# Patient Record
Sex: MALE | Race: WHITE | NOT HISPANIC OR LATINO | Employment: FULL TIME | URBAN - METROPOLITAN AREA
[De-identification: names, ages, dates, MRNs, and addresses within clinical notes are randomized per-mention and may not be internally consistent; named-entity substitution may affect disease eponyms.]

---

## 2018-11-26 ENCOUNTER — OFFICE VISIT (OUTPATIENT)
Dept: FAMILY MEDICINE CLINIC | Facility: CLINIC | Age: 28
End: 2018-11-26

## 2018-11-26 VITALS
BODY MASS INDEX: 26.21 KG/M2 | WEIGHT: 204.2 LBS | TEMPERATURE: 98.4 F | DIASTOLIC BLOOD PRESSURE: 70 MMHG | SYSTOLIC BLOOD PRESSURE: 110 MMHG | OXYGEN SATURATION: 98 % | HEIGHT: 74 IN | HEART RATE: 56 BPM

## 2018-11-26 DIAGNOSIS — J35.8 TONSIL STONE: ICD-10-CM

## 2018-11-26 DIAGNOSIS — I34.1 MVP (MITRAL VALVE PROLAPSE): Primary | ICD-10-CM

## 2018-11-26 DIAGNOSIS — R68.82 LIBIDO, DECREASED: ICD-10-CM

## 2018-11-26 DIAGNOSIS — J34.89 SINUS DRAINAGE: ICD-10-CM

## 2018-11-26 DIAGNOSIS — N52.8 OTHER MALE ERECTILE DYSFUNCTION: ICD-10-CM

## 2018-11-26 LAB
ALBUMIN SERPL-MCNC: 4.3 G/DL (ref 3.5–5.2)
ALBUMIN/GLOB SERPL: 1.5 G/DL
ALP SERPL-CCNC: 70 U/L (ref 39–117)
ALT SERPL W P-5'-P-CCNC: 58 U/L (ref 1–41)
ANION GAP SERPL CALCULATED.3IONS-SCNC: 7.9 MMOL/L
AST SERPL-CCNC: 37 U/L (ref 1–40)
BILIRUB SERPL-MCNC: 1 MG/DL (ref 0.1–1.2)
BUN BLD-MCNC: 11 MG/DL (ref 6–20)
BUN/CREAT SERPL: 11 (ref 7–25)
CALCIUM SPEC-SCNC: 9.6 MG/DL (ref 8.6–10.5)
CHLORIDE SERPL-SCNC: 103 MMOL/L (ref 98–107)
CO2 SERPL-SCNC: 28.1 MMOL/L (ref 22–29)
CREAT BLD-MCNC: 1 MG/DL (ref 0.76–1.27)
GFR SERPL CREATININE-BSD FRML MDRD: 109 ML/MIN/1.73
GFR SERPL CREATININE-BSD FRML MDRD: 90 ML/MIN/1.73
GLOBULIN UR ELPH-MCNC: 2.8 GM/DL
GLUCOSE BLD-MCNC: 94 MG/DL (ref 65–99)
POTASSIUM BLD-SCNC: 4.3 MMOL/L (ref 3.5–5.2)
PROT SERPL-MCNC: 7.1 G/DL (ref 6–8.5)
SODIUM BLD-SCNC: 139 MMOL/L (ref 136–145)
TSH SERPL DL<=0.05 MIU/L-ACNC: 1.18 MIU/ML (ref 0.27–4.2)

## 2018-11-26 PROCEDURE — 84443 ASSAY THYROID STIM HORMONE: CPT | Performed by: INTERNAL MEDICINE

## 2018-11-26 PROCEDURE — 99214 OFFICE O/P EST MOD 30 MIN: CPT | Performed by: INTERNAL MEDICINE

## 2018-11-26 PROCEDURE — 36415 COLL VENOUS BLD VENIPUNCTURE: CPT | Performed by: INTERNAL MEDICINE

## 2018-11-26 PROCEDURE — 80053 COMPREHEN METABOLIC PANEL: CPT | Performed by: INTERNAL MEDICINE

## 2018-11-26 RX ORDER — MONTELUKAST SODIUM 10 MG/1
10 TABLET ORAL NIGHTLY
Qty: 30 TABLET | Refills: 0 | Status: SHIPPED | OUTPATIENT
Start: 2018-11-26 | End: 2020-09-23

## 2018-11-27 DIAGNOSIS — I34.1 MVP (MITRAL VALVE PROLAPSE): Primary | ICD-10-CM

## 2018-11-27 LAB
TESTOST FREE SERPL-MCNC: 13.6 PG/ML (ref 9.3–26.5)
TESTOST SERPL-MCNC: 580 NG/DL (ref 264–916)

## 2018-12-13 ENCOUNTER — HOSPITAL ENCOUNTER (EMERGENCY)
Facility: HOSPITAL | Age: 28
Discharge: HOME | End: 2018-12-13
Attending: EMERGENCY MEDICINE | Admitting: EMERGENCY MEDICINE

## 2018-12-13 ENCOUNTER — APPOINTMENT (OUTPATIENT)
Dept: GENERAL RADIOLOGY | Facility: HOSPITAL | Age: 28
End: 2018-12-13

## 2018-12-13 VITALS
DIASTOLIC BLOOD PRESSURE: 82 MMHG | TEMPERATURE: 97.8 F | BODY MASS INDEX: 25.67 KG/M2 | RESPIRATION RATE: 16 BRPM | HEIGHT: 74 IN | OXYGEN SATURATION: 97 % | WEIGHT: 200 LBS | SYSTOLIC BLOOD PRESSURE: 127 MMHG | HEART RATE: 86 BPM

## 2018-12-13 DIAGNOSIS — S46.911A STRAIN OF RIGHT SHOULDER, INITIAL ENCOUNTER: Primary | ICD-10-CM
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
## 2018-12-13 PROCEDURE — 73030 X-RAY EXAM OF SHOULDER: CPT

## 2018-12-13 PROCEDURE — 99283 EMERGENCY DEPT VISIT LOW MDM: CPT

## 2018-12-13 RX ORDER — NAPROXEN 500 MG/1
500 TABLET ORAL 2 TIMES DAILY WITH MEALS
Qty: 20 TABLET | Refills: 0 | Status: SHIPPED | OUTPATIENT
Start: 2018-12-13 | End: 2018-12-24

## 2018-12-13 RX ORDER — MONTELUKAST SODIUM 10 MG/1
10 TABLET ORAL NIGHTLY
COMMUNITY
End: 2018-12-24
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
## 2018-12-17 ENCOUNTER — APPOINTMENT (OUTPATIENT)
Dept: CARDIOLOGY | Facility: HOSPITAL | Age: 28
End: 2018-12-17
Attending: INTERNAL MEDICINE

## 2018-12-19 ENCOUNTER — TELEPHONE (OUTPATIENT)
Dept: FAMILY MEDICINE CLINIC | Facility: CLINIC | Age: 28
End: 2018-12-19

## 2018-12-19 DIAGNOSIS — N52.9 ERECTILE DYSFUNCTION, UNSPECIFIED ERECTILE DYSFUNCTION TYPE: Primary | ICD-10-CM

## 2018-12-24 ENCOUNTER — OFFICE VISIT (OUTPATIENT)
Dept: CARDIOLOGY | Facility: CLINIC | Age: 28
End: 2018-12-24

## 2018-12-24 ENCOUNTER — HOSPITAL ENCOUNTER (OUTPATIENT)
Dept: CARDIOLOGY | Facility: HOSPITAL | Age: 28
Discharge: HOME | End: 2018-12-24
Attending: INTERNAL MEDICINE | Admitting: INTERNAL MEDICINE

## 2018-12-24 VITALS
WEIGHT: 205 LBS | HEART RATE: 66 BPM | SYSTOLIC BLOOD PRESSURE: 116 MMHG | DIASTOLIC BLOOD PRESSURE: 76 MMHG | BODY MASS INDEX: 26.31 KG/M2 | HEIGHT: 74 IN

## 2018-12-24 DIAGNOSIS — I34.1 MVP (MITRAL VALVE PROLAPSE): ICD-10-CM

## 2018-12-24 DIAGNOSIS — R00.2 PALPITATIONS: Primary | ICD-10-CM

## 2018-12-24 LAB
AORTIC ARCH: 2.38 CM
AORTIC DIMENSIONLESS INDEX: 0.7 (DI)
ASCENDING AORTA: 2.8 CM
BH CV ECHO MEAS - ACS: 2.3 CM
BH CV ECHO MEAS - AO MAX PG (FULL): 1.8 MMHG
BH CV ECHO MEAS - AO MAX PG: 4.6 MMHG
BH CV ECHO MEAS - AO MEAN PG (FULL): 0.8 MMHG
BH CV ECHO MEAS - AO MEAN PG: 2.4 MMHG
BH CV ECHO MEAS - AO ROOT AREA (BSA CORRECTED): 1.4
BH CV ECHO MEAS - AO ROOT AREA: 7.4 CM^2
BH CV ECHO MEAS - AO ROOT DIAM: 3.1 CM
BH CV ECHO MEAS - AO V2 MAX: 107.5 CM/SEC
BH CV ECHO MEAS - AO V2 MEAN: 71.4 CM/SEC
BH CV ECHO MEAS - AO V2 VTI: 21.9 CM
BH CV ECHO MEAS - ASC AORTA: 2.8 CM
BH CV ECHO MEAS - AVA(I,A): 1.9 CM^2
BH CV ECHO MEAS - AVA(I,D): 1.9 CM^2
BH CV ECHO MEAS - AVA(V,A): 2 CM^2
BH CV ECHO MEAS - AVA(V,D): 2 CM^2
BH CV ECHO MEAS - BSA(HAYCOCK): 2.2 M^2
BH CV ECHO MEAS - BSA: 2.1 M^2
BH CV ECHO MEAS - BZI_BMI: 25 KILOGRAMS/M^2
BH CV ECHO MEAS - BZI_METRIC_HEIGHT: 188 CM
BH CV ECHO MEAS - BZI_METRIC_WEIGHT: 88.5 KG
BH CV ECHO MEAS - EDV(CUBED): 131.3 ML
BH CV ECHO MEAS - EDV(MOD-SP2): 125 ML
BH CV ECHO MEAS - EDV(MOD-SP4): 102 ML
BH CV ECHO MEAS - EDV(TEICH): 122.8 ML
BH CV ECHO MEAS - EF(CUBED): 77 %
BH CV ECHO MEAS - EF(MOD-BP): 62 %
BH CV ECHO MEAS - EF(MOD-SP2): 56.8 %
BH CV ECHO MEAS - EF(MOD-SP4): 63.7 %
BH CV ECHO MEAS - EF(TEICH): 68.8 %
BH CV ECHO MEAS - ESV(CUBED): 30.2 ML
BH CV ECHO MEAS - ESV(MOD-SP2): 54 ML
BH CV ECHO MEAS - ESV(MOD-SP4): 37 ML
BH CV ECHO MEAS - ESV(TEICH): 38.3 ML
BH CV ECHO MEAS - FS: 38.7 %
BH CV ECHO MEAS - IVS/LVPW: 1
BH CV ECHO MEAS - IVSD: 0.86 CM
BH CV ECHO MEAS - LAT PEAK E' VEL: 11 CM/SEC
BH CV ECHO MEAS - LV DIASTOLIC VOL/BSA (35-75): 47.4 ML/M^2
BH CV ECHO MEAS - LV MASS(C)D: 148.9 GRAMS
BH CV ECHO MEAS - LV MASS(C)DI: 69.3 GRAMS/M^2
BH CV ECHO MEAS - LV MAX PG: 2.8 MMHG
BH CV ECHO MEAS - LV MEAN PG: 1.6 MMHG
BH CV ECHO MEAS - LV SYSTOLIC VOL/BSA (12-30): 17.2 ML/M^2
BH CV ECHO MEAS - LV V1 MAX: 83.9 CM/SEC
BH CV ECHO MEAS - LV V1 MEAN: 59.9 CM/SEC
BH CV ECHO MEAS - LV V1 VTI: 16 CM
BH CV ECHO MEAS - LVIDD: 5.1 CM
BH CV ECHO MEAS - LVIDS: 3.1 CM
BH CV ECHO MEAS - LVLD AP2: 8.7 CM
BH CV ECHO MEAS - LVLD AP4: 7.9 CM
BH CV ECHO MEAS - LVLS AP2: 7.1 CM
BH CV ECHO MEAS - LVLS AP4: 6.8 CM
BH CV ECHO MEAS - LVOT AREA (M): 2.5 CM^2
BH CV ECHO MEAS - LVOT AREA: 2.6 CM^2
BH CV ECHO MEAS - LVOT DIAM: 1.8 CM
BH CV ECHO MEAS - LVPWD: 0.82 CM
BH CV ECHO MEAS - MED PEAK E' VEL: 10 CM/SEC
BH CV ECHO MEAS - MV A DUR: 0.1 SEC
BH CV ECHO MEAS - MV A MAX VEL: 48.5 CM/SEC
BH CV ECHO MEAS - MV DEC SLOPE: 493.4 CM/SEC^2
BH CV ECHO MEAS - MV DEC TIME: 0.11 SEC
BH CV ECHO MEAS - MV E MAX VEL: 71.8 CM/SEC
BH CV ECHO MEAS - MV E/A: 1.5
BH CV ECHO MEAS - MV MAX PG: 3 MMHG
BH CV ECHO MEAS - MV MEAN PG: 1.2 MMHG
BH CV ECHO MEAS - MV P1/2T MAX VEL: 84.3 CM/SEC
BH CV ECHO MEAS - MV P1/2T: 50 MSEC
BH CV ECHO MEAS - MV V2 MAX: 86.9 CM/SEC
BH CV ECHO MEAS - MV V2 MEAN: 51.3 CM/SEC
BH CV ECHO MEAS - MV V2 VTI: 17.7 CM
BH CV ECHO MEAS - MVA P1/2T LCG: 2.6 CM^2
BH CV ECHO MEAS - MVA(P1/2T): 4.4 CM^2
BH CV ECHO MEAS - MVA(VTI): 2.3 CM^2
BH CV ECHO MEAS - PA ACC TIME: 0.1 SEC
BH CV ECHO MEAS - PA MAX PG (FULL): 2.7 MMHG
BH CV ECHO MEAS - PA MAX PG: 4.8 MMHG
BH CV ECHO MEAS - PA PR(ACCEL): 34.6 MMHG
BH CV ECHO MEAS - PA V2 MAX: 109.9 CM/SEC
BH CV ECHO MEAS - PI END-D VEL: 105.9 CM/SEC
BH CV ECHO MEAS - PULM A REVS DUR: 0.11 SEC
BH CV ECHO MEAS - PULM A REVS VEL: 23.3 CM/SEC
BH CV ECHO MEAS - PULM DIAS VEL: 43.7 CM/SEC
BH CV ECHO MEAS - PULM S/D: 0.84
BH CV ECHO MEAS - PULM SYS VEL: 36.9 CM/SEC
BH CV ECHO MEAS - PVA(V,A): 1.5 CM^2
BH CV ECHO MEAS - PVA(V,D): 1.5 CM^2
BH CV ECHO MEAS - QP/QS: 0.91
BH CV ECHO MEAS - RAP SYSTOLE: 3 MMHG
BH CV ECHO MEAS - RV MAX PG: 2.1 MMHG
BH CV ECHO MEAS - RV MEAN PG: 1.3 MMHG
BH CV ECHO MEAS - RV V1 MAX: 72.8 CM/SEC
BH CV ECHO MEAS - RV V1 MEAN: 54.2 CM/SEC
BH CV ECHO MEAS - RV V1 VTI: 16.2 CM
BH CV ECHO MEAS - RVOT AREA: 2.3 CM^2
BH CV ECHO MEAS - RVOT DIAM: 1.7 CM
BH CV ECHO MEAS - RVSP: 16 MMHG
BH CV ECHO MEAS - SI(AO): 75.7 ML/M^2
BH CV ECHO MEAS - SI(CUBED): 47 ML/M^2
BH CV ECHO MEAS - SI(LVOT): 19.2 ML/M^2
BH CV ECHO MEAS - SI(MOD-SP2): 33 ML/M^2
BH CV ECHO MEAS - SI(MOD-SP4): 30.2 ML/M^2
BH CV ECHO MEAS - SI(TEICH): 39.3 ML/M^2
BH CV ECHO MEAS - SUP REN AO DIAM: 1.57 CM
BH CV ECHO MEAS - SV(AO): 162.8 ML
BH CV ECHO MEAS - SV(CUBED): 101.1 ML
BH CV ECHO MEAS - SV(LVOT): 41.3 ML
BH CV ECHO MEAS - SV(MOD-SP2): 71 ML
BH CV ECHO MEAS - SV(MOD-SP4): 65 ML
BH CV ECHO MEAS - SV(RVOT): 37.5 ML
BH CV ECHO MEAS - SV(TEICH): 84.5 ML
BH CV ECHO MEAS - TAPSE (>1.6): 2.3 CM2
BH CV ECHO MEAS - TR MAX VEL: 178.1 CM/SEC
BH CV ECHO MEASUREMENTS AVERAGE E/E' RATIO: 6.84
BH CV VAS BP RIGHT ARM: NORMAL MMHG
BH CV XLRA - RV BASE: 2.68 CM
BH CV XLRA - TDI S': 10.1 CM/SEC
LEFT ATRIUM VOLUME INDEX: 21 ML/M2
LV EF 2D ECHO EST: 62 %
MAXIMAL PREDICTED HEART RATE: 194 BPM
SINUS: 2.9 CM
STJ: 2.86 CM
STRESS TARGET HR: 165 BPM

## 2018-12-24 PROCEDURE — 93000 ELECTROCARDIOGRAM COMPLETE: CPT | Performed by: INTERNAL MEDICINE

## 2018-12-24 PROCEDURE — 93306 TTE W/DOPPLER COMPLETE: CPT

## 2018-12-24 PROCEDURE — 99203 OFFICE O/P NEW LOW 30 MIN: CPT | Performed by: INTERNAL MEDICINE

## 2018-12-24 PROCEDURE — 93306 TTE W/DOPPLER COMPLETE: CPT | Performed by: INTERNAL MEDICINE

## 2018-12-26 NOTE — PROGRESS NOTES
Subjective:     Encounter Date:2018      Patient ID: Trav Steward III is a 28 y.o. male.    Chief Complaint:  28 year old man with remote history of Still's disease about 5 years ago He was hospitalized at [facility] at that time with joint pains in multiple regions. Echo at that time showed MVP with mild MR according to the patient. He was asked to repeat his echo in 2-3 years. Since then he has done well. Is very active as a . Occasionally gets palpitations, mostly at night during times of stress. They can last for minutes to half an hour but have no associated symptoms and are not bothersome during the day.         The following portions of the patient's history were reviewed and updated as appropriate: allergies, current medications, past family history, past medical history, past social history, past surgical history and problem list.    Past Medical History:   Diagnosis Date   • Mitral valve prolapse    • Reduced libido    • Right shoulder pain    • Tonsil stone        Family History   Problem Relation Age of Onset   • Hypertension Mother    • Diabetes Maternal Grandfather        Social History     Socioeconomic History   • Marital status: Single     Spouse name: Not on file   • Number of children: Not on file   • Years of education: Not on file   • Highest education level: Not on file   Tobacco Use   • Smoking status: Former Smoker     Last attempt to quit: 2013     Years since quittin.0   • Smokeless tobacco: Never Used   • Tobacco comment: caffeine use   Substance and Sexual Activity   • Alcohol use: Yes     Frequency: Never     Comment: Socially   • Drug use: Defer   • Sexual activity: Defer   Social History Narrative    ** Merged History Encounter **            Allergies   Allergen Reactions   • Penicillins    • Penicillins Unknown (See Comments)     Childhood allergy        No past surgical history on file.    Review of Systems   Constitution: Negative.    Cardiovascular: Negative.    Respiratory: Negative.          ECG 12 Lead  Date/Time: 12/26/2018 6:07 PM  Performed by: Teressa Olivera MD  Authorized by: Teressa Olivera MD   Previous ECG: no previous ECG available  Rhythm: sinus rhythm  Rate: normal  QRS axis: normal  Clinical impression: normal ECG               Objective:     Physical Exam  GEN: no distress, alert and oriented  Lungs CTAB, no rales or wheezes  Chest: no abnormalities  Heart: RRR, no murmurs  Abdo: soft,  Nontender, nondistended  Extr: no edema, +2 DP and 2+ carotid pulses b/l  Skin: no rash or bruising  Psych: organized thought, normal behavior and affect    Lab Review:         Assessment:         No diagnosis found.       Plan:       28 year old man with PMH of Still's disease, and MVP    1. Echo today shows normal Mitral valve without regurgitation or significant prolapse. I discussed with the patient that this is reassuring and that at the most he could repeat the echo in 5 years to verify but I don't feel strongly about it. He does not require any dental prophylaxis or restriction in activities.   2. Palpitations- he did have frequent palpitations a few months ago but states he has barely had them once a month recently. I instructed him to call me if the palpitations recur more frequently and we can do a holter at that time. I don't think we'd be able to catch them at this point.     Thank you for including me in the care of this patient. Will continue to follow the patient as needed. Please call with any further questions or concerns.          Teressa Olivera MD  12/26/18

## 2019-03-22 ENCOUNTER — TELEPHONE (OUTPATIENT)
Dept: CARDIOLOGY | Facility: CLINIC | Age: 29
End: 2019-03-22

## 2019-03-22 DIAGNOSIS — Q21.12 PFO (PATENT FORAMEN OVALE): Primary | ICD-10-CM
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
## 2019-05-09 ENCOUNTER — HOSPITAL ENCOUNTER (OUTPATIENT)
Dept: CARDIOLOGY | Facility: HOSPITAL | Age: 29
Discharge: HOME | End: 2019-05-09
Admitting: INTERNAL MEDICINE

## 2019-05-09 VITALS
HEART RATE: 70 BPM | HEIGHT: 74 IN | OXYGEN SATURATION: 97 % | SYSTOLIC BLOOD PRESSURE: 122 MMHG | DIASTOLIC BLOOD PRESSURE: 58 MMHG | WEIGHT: 190 LBS | BODY MASS INDEX: 24.38 KG/M2

## 2019-05-09 DIAGNOSIS — Q21.12 PFO (PATENT FORAMEN OVALE): ICD-10-CM

## 2019-05-09 LAB
BH CV ECHO MEAS - ACS: 2.3 CM
BH CV ECHO MEAS - AO ROOT AREA (BSA CORRECTED): 1.5
BH CV ECHO MEAS - AO ROOT AREA: 7.7 CM^2
BH CV ECHO MEAS - AO ROOT DIAM: 3.1 CM
BH CV ECHO MEAS - BSA(HAYCOCK): 2.1 M^2
BH CV ECHO MEAS - BSA: 2.1 M^2
BH CV ECHO MEAS - BZI_BMI: 24.4 KILOGRAMS/M^2
BH CV ECHO MEAS - BZI_METRIC_HEIGHT: 188 CM
BH CV ECHO MEAS - BZI_METRIC_WEIGHT: 86.2 KG
BH CV ECHO MEAS - EDV(CUBED): 145.2 ML
BH CV ECHO MEAS - EDV(MOD-SP2): 101 ML
BH CV ECHO MEAS - EDV(MOD-SP4): 121 ML
BH CV ECHO MEAS - EDV(TEICH): 132.7 ML
BH CV ECHO MEAS - EF(CUBED): 87.2 %
BH CV ECHO MEAS - EF(MOD-BP): 57 %
BH CV ECHO MEAS - EF(MOD-SP2): 57.4 %
BH CV ECHO MEAS - EF(MOD-SP4): 57 %
BH CV ECHO MEAS - EF(TEICH): 80.6 %
BH CV ECHO MEAS - ESV(CUBED): 18.6 ML
BH CV ECHO MEAS - ESV(MOD-SP2): 43 ML
BH CV ECHO MEAS - ESV(MOD-SP4): 52 ML
BH CV ECHO MEAS - ESV(TEICH): 25.8 ML
BH CV ECHO MEAS - FS: 49.6 %
BH CV ECHO MEAS - IVS/LVPW: 1.1
BH CV ECHO MEAS - IVSD: 1.1 CM
BH CV ECHO MEAS - LV DIASTOLIC VOL/BSA (35-75): 56.9 ML/M^2
BH CV ECHO MEAS - LV MASS(C)D: 208.9 GRAMS
BH CV ECHO MEAS - LV MASS(C)DI: 98.2 GRAMS/M^2
BH CV ECHO MEAS - LV SYSTOLIC VOL/BSA (12-30): 24.5 ML/M^2
BH CV ECHO MEAS - LVIDD: 5.3 CM
BH CV ECHO MEAS - LVIDS: 2.6 CM
BH CV ECHO MEAS - LVLD AP2: 8.6 CM
BH CV ECHO MEAS - LVLD AP4: 8.6 CM
BH CV ECHO MEAS - LVLS AP2: 7.4 CM
BH CV ECHO MEAS - LVLS AP4: 7.5 CM
BH CV ECHO MEAS - LVPWD: 1 CM
BH CV ECHO MEAS - SI(CUBED): 59.5 ML/M^2
BH CV ECHO MEAS - SI(MOD-SP2): 27.3 ML/M^2
BH CV ECHO MEAS - SI(MOD-SP4): 32.5 ML/M^2
BH CV ECHO MEAS - SI(TEICH): 50.3 ML/M^2
BH CV ECHO MEAS - SV(CUBED): 126.6 ML
BH CV ECHO MEAS - SV(MOD-SP2): 58 ML
BH CV ECHO MEAS - SV(MOD-SP4): 69 ML
BH CV ECHO MEAS - SV(TEICH): 106.9 ML
BH CV ECHO MEAS - TR MAX VEL: 142.5 CM/SEC
LEFT ATRIUM VOLUME INDEX: 25 ML/M2

## 2019-05-09 PROCEDURE — 93325 DOPPLER ECHO COLOR FLOW MAPG: CPT | Performed by: INTERNAL MEDICINE

## 2019-05-09 PROCEDURE — 93321 DOPPLER ECHO F-UP/LMTD STD: CPT

## 2019-05-09 PROCEDURE — 93308 TTE F-UP OR LMTD: CPT

## 2019-05-09 PROCEDURE — 93308 TTE F-UP OR LMTD: CPT | Performed by: INTERNAL MEDICINE

## 2019-05-09 PROCEDURE — 93325 DOPPLER ECHO COLOR FLOW MAPG: CPT

## 2019-05-09 PROCEDURE — 93321 DOPPLER ECHO F-UP/LMTD STD: CPT | Performed by: INTERNAL MEDICINE

## 2019-05-14 ENCOUNTER — TELEPHONE (OUTPATIENT)
Dept: CARDIOLOGY | Facility: CLINIC | Age: 29
End: 2019-05-14
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
## 2019-08-12 ENCOUNTER — TELEPHONE (OUTPATIENT)
Dept: FAMILY MEDICINE CLINIC | Facility: CLINIC | Age: 29
End: 2019-08-12

## 2019-08-12 NOTE — TELEPHONE ENCOUNTER
Patient informed we can do appt with xray of chest if he is concerned.  Right now he has no symptoms and just wanted to report it.   He will call if symptoms of wheezing, sob occur.alexandra

## 2020-09-22 ENCOUNTER — OFFICE VISIT (OUTPATIENT)
Dept: FAMILY MEDICINE CLINIC | Facility: CLINIC | Age: 30
End: 2020-09-22

## 2020-09-22 VITALS
WEIGHT: 199 LBS | OXYGEN SATURATION: 100 % | DIASTOLIC BLOOD PRESSURE: 76 MMHG | TEMPERATURE: 98.9 F | HEART RATE: 101 BPM | BODY MASS INDEX: 25.54 KG/M2 | SYSTOLIC BLOOD PRESSURE: 110 MMHG | HEIGHT: 74 IN

## 2020-09-22 DIAGNOSIS — R10.9 ABDOMINAL CRAMPING: Primary | ICD-10-CM

## 2020-09-22 DIAGNOSIS — K40.90 HERNIA, INGUINAL, RIGHT: ICD-10-CM

## 2020-09-22 DIAGNOSIS — Z83.79 FH: CROHN'S DISEASE: ICD-10-CM

## 2020-09-22 LAB
ALBUMIN SERPL-MCNC: 4.3 G/DL (ref 3.5–5.2)
ALBUMIN/GLOB SERPL: 1.7 G/DL
ALP SERPL-CCNC: 84 U/L (ref 39–117)
ALT SERPL W P-5'-P-CCNC: 23 U/L (ref 1–41)
ANION GAP SERPL CALCULATED.3IONS-SCNC: 10.6 MMOL/L (ref 5–15)
AST SERPL-CCNC: 21 U/L (ref 1–40)
BILIRUB SERPL-MCNC: 0.4 MG/DL (ref 0–1.2)
BUN SERPL-MCNC: 12 MG/DL (ref 6–20)
BUN/CREAT SERPL: 12 (ref 7–25)
CALCIUM SPEC-SCNC: 9.7 MG/DL (ref 8.6–10.5)
CHLORIDE SERPL-SCNC: 104 MMOL/L (ref 98–107)
CO2 SERPL-SCNC: 26.4 MMOL/L (ref 22–29)
CREAT SERPL-MCNC: 1 MG/DL (ref 0.76–1.27)
ERYTHROCYTE [DISTWIDTH] IN BLOOD BY AUTOMATED COUNT: 12 % (ref 12.3–15.4)
ERYTHROCYTE [SEDIMENTATION RATE] IN BLOOD: 1 MM/HR (ref 0–15)
GFR SERPL CREATININE-BSD FRML MDRD: 108 ML/MIN/1.73
GFR SERPL CREATININE-BSD FRML MDRD: 89 ML/MIN/1.73
GLOBULIN UR ELPH-MCNC: 2.5 GM/DL
GLUCOSE SERPL-MCNC: 80 MG/DL (ref 65–99)
HCT VFR BLD AUTO: 45.6 % (ref 37.5–51)
HGB BLD-MCNC: 15.5 G/DL (ref 13–17.7)
LYMPHOCYTES # BLD AUTO: 2.4 10*3/MM3 (ref 0.7–3.1)
LYMPHOCYTES NFR BLD AUTO: 37.5 % (ref 19.6–45.3)
MCH RBC QN AUTO: 29.5 PG (ref 26.6–33)
MCHC RBC AUTO-ENTMCNC: 33.9 G/DL (ref 31.5–35.7)
MCV RBC AUTO: 87.1 FL (ref 79–97)
MONOCYTES # BLD AUTO: 0.4 10*3/MM3 (ref 0.1–0.9)
MONOCYTES NFR BLD AUTO: 6.8 % (ref 5–12)
NEUTROPHILS NFR BLD AUTO: 3.5 10*3/MM3 (ref 1.7–7)
NEUTROPHILS NFR BLD AUTO: 55.7 % (ref 42.7–76)
PLATELET # BLD AUTO: 218 10*3/MM3 (ref 140–450)
PMV BLD AUTO: 7.7 FL (ref 6–12)
POTASSIUM SERPL-SCNC: 4 MMOL/L (ref 3.5–5.2)
PROT SERPL-MCNC: 6.8 G/DL (ref 6–8.5)
RBC # BLD AUTO: 5.24 10*6/MM3 (ref 4.14–5.8)
SODIUM SERPL-SCNC: 141 MMOL/L (ref 136–145)
WBC # BLD AUTO: 6.3 10*3/MM3 (ref 3.4–10.8)

## 2020-09-22 PROCEDURE — 85025 COMPLETE CBC W/AUTO DIFF WBC: CPT | Performed by: INTERNAL MEDICINE

## 2020-09-22 PROCEDURE — 80053 COMPREHEN METABOLIC PANEL: CPT | Performed by: INTERNAL MEDICINE

## 2020-09-22 PROCEDURE — 36415 COLL VENOUS BLD VENIPUNCTURE: CPT | Performed by: INTERNAL MEDICINE

## 2020-09-22 PROCEDURE — 85652 RBC SED RATE AUTOMATED: CPT | Performed by: INTERNAL MEDICINE

## 2020-09-22 PROCEDURE — 99214 OFFICE O/P EST MOD 30 MIN: CPT | Performed by: INTERNAL MEDICINE

## 2020-09-22 NOTE — PROGRESS NOTES
Subjective   Trav Steward III is a 30 y.o. male.  Patient here for 2 reasons 1 he thinks he is got a bulge or possible hernia in the right groin area main reason is can be for his recurrent cramping loose stools.  Was supposed to be seen a GI many several years ago he did not make that visit continues to cramp with many foods and eats sometimes is bad sometimes not does have a history family history should say of Crohn's disease is never been labeled as having irritable bowel or other.  There is no height or weight on file to calculate BMI.  History of Present Illness   As above bulge in right groin area with some soreness suggesting hernia by history as well as ongoing intermittent cramping pain with eating does wax and wane no blood or mucus noted in stool does have family history for Crohn's disease.  Weight is been relatively stable no weight loss he is some changes when because he is doing through effort no arthralgias type problems.  He also penicillin undefined and is positive for hypertension diabetes.  Former smoker quit in 2013  Review of Systems   All other systems reviewed and are negative.      Objective   There were no vitals filed for this visit.      Physical Exam  Vitals signs and nursing note reviewed.   Constitutional:       Appearance: Normal appearance.   HENT:      Head: Normocephalic and atraumatic.   Eyes:      General: No scleral icterus.     Pupils: Pupils are equal, round, and reactive to light.   Cardiovascular:      Rate and Rhythm: Normal rate. Rhythm irregular.      Heart sounds: Normal heart sounds.   Pulmonary:      Effort: Pulmonary effort is normal.      Breath sounds: Normal breath sounds.   Abdominal:      General: Abdomen is flat. Bowel sounds are normal.      Palpations: Abdomen is soft.      Comments: Patient with a bulge in the right inguinal area low does flatten out he can feel it compress reduce with gentle pressure.  Is most noticeable when he goes from supine to  standing.  I cannot tell for sure on exam the scrotum there is a does extend into the sac to minimal degree or not on the right but regardless does need to see general surgery for this.   Skin:     General: Skin is warm and dry.   Neurological:      General: No focal deficit present.      Mental Status: He is alert.      Comments: Unremarkable gait and station         No results found for: INR    Procedures    Assessment/Plan   .  1.  Abdominal cramping recurrent plan await pending labs will initiate referral to GI    2.  Right inguinal hernia refer to Dr. cornea group.    3.  Family history for Crohn's disease further evaluation by GI.    Much of this encounter note is an electronic transcription/translation of spoken language to printed text.  The electronic translation of spoken language may permit erroneous, or at times, nonsensical words or phrases to be inadvertently transcribed.  Although I have reviewed the note for such errors, some may still exist. If there are questions or for further clarification, please contact me.  There are no diagnoses linked to this encounter.

## 2020-09-29 ENCOUNTER — TELEPHONE (OUTPATIENT)
Dept: FAMILY MEDICINE CLINIC | Facility: CLINIC | Age: 30
End: 2020-09-29

## 2020-09-29 ENCOUNTER — OFFICE VISIT (OUTPATIENT)
Dept: SURGERY | Facility: CLINIC | Age: 30
End: 2020-09-29

## 2020-09-29 VITALS — BODY MASS INDEX: 25.67 KG/M2 | WEIGHT: 200 LBS | HEIGHT: 74 IN

## 2020-09-29 DIAGNOSIS — K40.90 RIGHT INGUINAL HERNIA: Primary | ICD-10-CM

## 2020-09-29 PROCEDURE — 99204 OFFICE O/P NEW MOD 45 MIN: CPT | Performed by: PHYSICIAN ASSISTANT

## 2020-09-29 RX ORDER — CLINDAMYCIN PHOSPHATE 900 MG/50ML
900 INJECTION INTRAVENOUS ONCE
Status: CANCELLED | OUTPATIENT
Start: 2020-10-23 | End: 2020-09-29

## 2020-09-29 NOTE — TELEPHONE ENCOUNTER
PATIENT IS CALLING TO CHECK ON A REFERRAL TO GASTRO. HE HAS NOT GOT A CALL ; DID INFORM IT SAYS PENDING REVIEW. PLEASE CALL HIM AND CHECK ON THIS REFERRAL TO SEE IF THERE IS ANYTHING NEEDED FOR THE SPECIALIST TO APPROVE TO SEE THIS PATIENT.    [facility] [place]#: [phone]

## 2020-09-29 NOTE — PROGRESS NOTES
"CC:    Right inguinal hernia    HPI:    This is a 30-year-old gentleman presenting to the office today at the request of Dr. Jhony Mcmahan for consultation.  He states that going on a month now he has noticed a bulge with pain that comes and goes in his right groin he states he believes is related to GI issues that he is having currently and has been requiring him to strain on the toilet.  He states that occasionally he has issues with the bulge being very firm and tender and not easily reducing.  He does deny having nausea, vomiting, abdominal distention or any other complaints.  He does have an appointment upcoming with a gastroenterologist to further investigate his symptoms.    PMH:    mitral valve prolapse  patent foramen ovale    PSH:     None    SH:  Former smoker, quit in 2013, does consume alcohol    FMH:  No colorectal cancer in his family history, grandmother with Crohn's    ALLERGIES:   Penicillin    MEDICATIONS:  Reviewed and reconciled in Epic.    ROS:    Positive for abdominal pain, constipation, diarrhea and nausea.  All other systems reviewed and negative other than presenting complaints.    PE:  Vitals: Weight 200 height 74\" BMI 25.7  Constitutional: Well-nourished, well-developed male in no acute distress  HEENT: Normocephalic, atraumatic, sclera anicteric, normal conjunctiva  Pulmonary: Clear to auscultation bilaterally, normal respiratory effort, no wheezes, rales or rhonchi noted  Cardiac: Regular rate and rhythm, no murmurs, gallops or rubs noted  Abdomen: Soft, nontender, nondistended, normal bowel sounds  : Small easily reducible right-sided inguinal hernia, no inguinal hernia on the left  Skin: Warm, dry, intact, no rashes noted  Musculoskeletal: Normal gait, no joint asymmetries  Psych: Alert and orient x3, normal affect, normal judgment    CLINICAL SUMMARY (A/P):    This is a 30-year-old gentleman presenting with a symptomatic right inguinal hernia.  As such I have recommended that we " proceed with a laparoscopic right inguinal hernia repair.  I discussed with him the procedure and he understands the nature of the procedure and the risks including but not limited to bleeding, infection, use of mesh, and recurrence.  He will be following up with Dr. Landers for his GI concerns.  All questions were answered at the time of this visit and they were willing to proceed with all recommendations.      Jos Bunch PA-C

## 2020-09-30 ENCOUNTER — TRANSCRIBE ORDERS (OUTPATIENT)
Dept: SLEEP MEDICINE | Facility: HOSPITAL | Age: 30
End: 2020-09-30

## 2020-09-30 DIAGNOSIS — Z01.818 OTHER SPECIFIED PRE-OPERATIVE EXAMINATION: Primary | ICD-10-CM

## 2020-09-30 PROBLEM — K40.90 RIGHT INGUINAL HERNIA: Status: ACTIVE | Noted: 2020-09-30

## 2020-10-21 ENCOUNTER — LAB (OUTPATIENT)
Dept: LAB | Facility: HOSPITAL | Age: 30
End: 2020-10-21

## 2020-10-21 DIAGNOSIS — Z01.818 OTHER SPECIFIED PRE-OPERATIVE EXAMINATION: ICD-10-CM

## 2020-10-21 PROCEDURE — U0004 COV-19 TEST NON-CDC HGH THRU: HCPCS | Performed by: INTERNAL MEDICINE

## 2020-10-21 PROCEDURE — C9803 HOPD COVID-19 SPEC COLLECT: HCPCS

## 2020-10-22 LAB — SARS-COV-2 RNA RESP QL NAA+PROBE: NOT DETECTED

## 2020-10-23 ENCOUNTER — HOSPITAL ENCOUNTER (OUTPATIENT)
Facility: HOSPITAL | Age: 30
Setting detail: HOSPITAL OUTPATIENT SURGERY
Discharge: HOME | End: 2020-10-23
Attending: SURGERY | Admitting: SURGERY

## 2020-10-23 ENCOUNTER — ANESTHESIA (OUTPATIENT)
Dept: PERIOP | Facility: HOSPITAL | Age: 30
End: 2020-10-23

## 2020-10-23 ENCOUNTER — ANESTHESIA EVENT (OUTPATIENT)
Dept: PERIOP | Facility: HOSPITAL | Age: 30
End: 2020-10-23

## 2020-10-23 VITALS
BODY MASS INDEX: 25.22 KG/M2 | TEMPERATURE: 97 F | HEART RATE: 51 BPM | WEIGHT: 196.43 LBS | OXYGEN SATURATION: 100 % | SYSTOLIC BLOOD PRESSURE: 117 MMHG | DIASTOLIC BLOOD PRESSURE: 67 MMHG | RESPIRATION RATE: 16 BRPM

## 2020-10-23 DIAGNOSIS — K40.90 RIGHT INGUINAL HERNIA: ICD-10-CM

## 2020-10-23 PROCEDURE — 25010000002 KETOROLAC TROMETHAMINE PER 15 MG: Performed by: NURSE ANESTHETIST, CERTIFIED REGISTERED

## 2020-10-23 PROCEDURE — 25010000002 MIDAZOLAM PER 1 MG: Performed by: ANESTHESIOLOGY

## 2020-10-23 PROCEDURE — 49650 LAP ING HERNIA REPAIR INIT: CPT | Performed by: SURGERY

## 2020-10-23 PROCEDURE — 25010000002 FENTANYL CITRATE (PF) 100 MCG/2ML SOLUTION: Performed by: NURSE ANESTHETIST, CERTIFIED REGISTERED

## 2020-10-23 PROCEDURE — 25010000002 DEXAMETHASONE PER 1 MG: Performed by: NURSE ANESTHETIST, CERTIFIED REGISTERED

## 2020-10-23 PROCEDURE — 49650 LAP ING HERNIA REPAIR INIT: CPT | Performed by: PHYSICIAN ASSISTANT

## 2020-10-23 PROCEDURE — 25010000002 PROPOFOL 10 MG/ML EMULSION: Performed by: NURSE ANESTHETIST, CERTIFIED REGISTERED

## 2020-10-23 PROCEDURE — 25010000002 FENTANYL CITRATE (PF) 100 MCG/2ML SOLUTION: Performed by: ANESTHESIOLOGY

## 2020-10-23 PROCEDURE — C1781 MESH (IMPLANTABLE): HCPCS | Performed by: SURGERY

## 2020-10-23 PROCEDURE — 25010000002 HYDROMORPHONE PER 4 MG: Performed by: NURSE ANESTHETIST, CERTIFIED REGISTERED

## 2020-10-23 PROCEDURE — 25010000002 NEOSTIGMINE PER 0.5 MG: Performed by: NURSE ANESTHETIST, CERTIFIED REGISTERED

## 2020-10-23 PROCEDURE — 25010000002 ONDANSETRON PER 1 MG: Performed by: NURSE ANESTHETIST, CERTIFIED REGISTERED

## 2020-10-23 DEVICE — HEMOLOK ML 6 CLIPS/CART
Type: IMPLANTABLE DEVICE | Site: ABDOMEN | Status: FUNCTIONAL
Brand: WECK

## 2020-10-23 DEVICE — BARD MESH
Type: IMPLANTABLE DEVICE | Site: ABDOMEN | Status: FUNCTIONAL
Brand: BARD MESH

## 2020-10-23 DEVICE — FIXATION DEVICE;15 VIOLET ABSORBABLE TACKS
Type: IMPLANTABLE DEVICE | Site: ABDOMEN | Status: FUNCTIONAL
Brand: ABSORBATACK

## 2020-10-23 RX ORDER — SODIUM CHLORIDE, SODIUM LACTATE, POTASSIUM CHLORIDE, CALCIUM CHLORIDE 600; 310; 30; 20 MG/100ML; MG/100ML; MG/100ML; MG/100ML
9 INJECTION, SOLUTION INTRAVENOUS CONTINUOUS
Status: DISCONTINUED | OUTPATIENT
Start: 2020-10-23 | End: 2020-10-23 | Stop reason: HOSPADM

## 2020-10-23 RX ORDER — HYDROCODONE BITARTRATE AND ACETAMINOPHEN 5; 325 MG/1; MG/1
1 TABLET ORAL ONCE AS NEEDED
Status: COMPLETED | OUTPATIENT
Start: 2020-10-23 | End: 2020-10-23

## 2020-10-23 RX ORDER — MIDAZOLAM HYDROCHLORIDE 1 MG/ML
1 INJECTION INTRAMUSCULAR; INTRAVENOUS
Status: DISCONTINUED | OUTPATIENT
Start: 2020-10-23 | End: 2020-10-23 | Stop reason: HOSPADM

## 2020-10-23 RX ORDER — FENTANYL CITRATE 50 UG/ML
INJECTION, SOLUTION INTRAMUSCULAR; INTRAVENOUS AS NEEDED
Status: DISCONTINUED | OUTPATIENT
Start: 2020-10-23 | End: 2020-10-23 | Stop reason: SURG

## 2020-10-23 RX ORDER — ACETAMINOPHEN 650 MG/1
650 SUPPOSITORY RECTAL ONCE AS NEEDED
Status: DISCONTINUED | OUTPATIENT
Start: 2020-10-23 | End: 2020-10-23 | Stop reason: HOSPADM

## 2020-10-23 RX ORDER — NALBUPHINE HCL 10 MG/ML
2 AMPUL (ML) INJECTION EVERY 4 HOURS PRN
Status: DISCONTINUED | OUTPATIENT
Start: 2020-10-23 | End: 2020-10-23 | Stop reason: HOSPADM

## 2020-10-23 RX ORDER — ACETAMINOPHEN 325 MG/1
650 TABLET ORAL ONCE AS NEEDED
Status: DISCONTINUED | OUTPATIENT
Start: 2020-10-23 | End: 2020-10-23 | Stop reason: HOSPADM

## 2020-10-23 RX ORDER — KETOROLAC TROMETHAMINE 30 MG/ML
INJECTION, SOLUTION INTRAMUSCULAR; INTRAVENOUS AS NEEDED
Status: DISCONTINUED | OUTPATIENT
Start: 2020-10-23 | End: 2020-10-23 | Stop reason: SURG

## 2020-10-23 RX ORDER — ACETAMINOPHEN 500 MG
500 TABLET ORAL ONCE
Status: COMPLETED | OUTPATIENT
Start: 2020-10-23 | End: 2020-10-23

## 2020-10-23 RX ORDER — HYDROMORPHONE HCL 110MG/55ML
PATIENT CONTROLLED ANALGESIA SYRINGE INTRAVENOUS AS NEEDED
Status: DISCONTINUED | OUTPATIENT
Start: 2020-10-23 | End: 2020-10-23 | Stop reason: SURG

## 2020-10-23 RX ORDER — SODIUM CHLORIDE 9 MG/ML
INJECTION, SOLUTION INTRAVENOUS AS NEEDED
Status: DISCONTINUED | OUTPATIENT
Start: 2020-10-23 | End: 2020-10-23 | Stop reason: HOSPADM

## 2020-10-23 RX ORDER — ROCURONIUM BROMIDE 10 MG/ML
INJECTION, SOLUTION INTRAVENOUS AS NEEDED
Status: DISCONTINUED | OUTPATIENT
Start: 2020-10-23 | End: 2020-10-23 | Stop reason: SURG

## 2020-10-23 RX ORDER — LIDOCAINE HYDROCHLORIDE 10 MG/ML
0.5 INJECTION, SOLUTION EPIDURAL; INFILTRATION; INTRACAUDAL; PERINEURAL ONCE AS NEEDED
Status: DISCONTINUED | OUTPATIENT
Start: 2020-10-23 | End: 2020-10-23 | Stop reason: HOSPADM

## 2020-10-23 RX ORDER — DIPHENHYDRAMINE HYDROCHLORIDE 50 MG/ML
12.5 INJECTION INTRAMUSCULAR; INTRAVENOUS
Status: DISCONTINUED | OUTPATIENT
Start: 2020-10-23 | End: 2020-10-23 | Stop reason: HOSPADM

## 2020-10-23 RX ORDER — BUPIVACAINE HYDROCHLORIDE AND EPINEPHRINE 5; 5 MG/ML; UG/ML
INJECTION, SOLUTION PERINEURAL AS NEEDED
Status: DISCONTINUED | OUTPATIENT
Start: 2020-10-23 | End: 2020-10-23 | Stop reason: HOSPADM

## 2020-10-23 RX ORDER — HYDRALAZINE HYDROCHLORIDE 20 MG/ML
5 INJECTION INTRAMUSCULAR; INTRAVENOUS
Status: DISCONTINUED | OUTPATIENT
Start: 2020-10-23 | End: 2020-10-23 | Stop reason: HOSPADM

## 2020-10-23 RX ORDER — CLINDAMYCIN PHOSPHATE 900 MG/50ML
900 INJECTION INTRAVENOUS ONCE
Status: COMPLETED | OUTPATIENT
Start: 2020-10-23 | End: 2020-10-23

## 2020-10-23 RX ORDER — ONDANSETRON 2 MG/ML
INJECTION INTRAMUSCULAR; INTRAVENOUS AS NEEDED
Status: DISCONTINUED | OUTPATIENT
Start: 2020-10-23 | End: 2020-10-23 | Stop reason: SURG

## 2020-10-23 RX ORDER — PROPOFOL 10 MG/ML
VIAL (ML) INTRAVENOUS AS NEEDED
Status: DISCONTINUED | OUTPATIENT
Start: 2020-10-23 | End: 2020-10-23 | Stop reason: SURG

## 2020-10-23 RX ORDER — FENTANYL CITRATE 50 UG/ML
50 INJECTION, SOLUTION INTRAMUSCULAR; INTRAVENOUS
Status: DISCONTINUED | OUTPATIENT
Start: 2020-10-23 | End: 2020-10-23 | Stop reason: HOSPADM

## 2020-10-23 RX ORDER — SODIUM CHLORIDE 0.9 % (FLUSH) 0.9 %
10 SYRINGE (ML) INJECTION EVERY 12 HOURS SCHEDULED
Status: DISCONTINUED | OUTPATIENT
Start: 2020-10-23 | End: 2020-10-23 | Stop reason: HOSPADM

## 2020-10-23 RX ORDER — ONDANSETRON 4 MG/1
4 TABLET, FILM COATED ORAL EVERY 6 HOURS PRN
Qty: 10 TABLET | Refills: 1 | Status: SHIPPED | OUTPATIENT
Start: 2020-10-23 | End: 2020-10-29

## 2020-10-23 RX ORDER — HYDROCODONE BITARTRATE AND ACETAMINOPHEN 5; 325 MG/1; MG/1
TABLET ORAL
Qty: 24 TABLET | Refills: 0 | Status: SHIPPED | OUTPATIENT
Start: 2020-10-23 | End: 2020-10-29

## 2020-10-23 RX ORDER — SODIUM CHLORIDE 0.9 % (FLUSH) 0.9 %
10 SYRINGE (ML) INJECTION AS NEEDED
Status: DISCONTINUED | OUTPATIENT
Start: 2020-10-23 | End: 2020-10-23 | Stop reason: HOSPADM

## 2020-10-23 RX ORDER — NALBUPHINE HCL 10 MG/ML
10 AMPUL (ML) INJECTION EVERY 4 HOURS PRN
Status: DISCONTINUED | OUTPATIENT
Start: 2020-10-23 | End: 2020-10-23 | Stop reason: HOSPADM

## 2020-10-23 RX ORDER — HYDROMORPHONE HYDROCHLORIDE 1 MG/ML
0.25 INJECTION, SOLUTION INTRAMUSCULAR; INTRAVENOUS; SUBCUTANEOUS
Status: DISCONTINUED | OUTPATIENT
Start: 2020-10-23 | End: 2020-10-23 | Stop reason: HOSPADM

## 2020-10-23 RX ORDER — NALOXONE HCL 0.4 MG/ML
0.4 VIAL (ML) INJECTION AS NEEDED
Status: DISCONTINUED | OUTPATIENT
Start: 2020-10-23 | End: 2020-10-23 | Stop reason: HOSPADM

## 2020-10-23 RX ORDER — DEXAMETHASONE SODIUM PHOSPHATE 10 MG/ML
INJECTION INTRAMUSCULAR; INTRAVENOUS AS NEEDED
Status: DISCONTINUED | OUTPATIENT
Start: 2020-10-23 | End: 2020-10-23 | Stop reason: SURG

## 2020-10-23 RX ORDER — LIDOCAINE HYDROCHLORIDE 20 MG/ML
INJECTION, SOLUTION INFILTRATION; PERINEURAL AS NEEDED
Status: DISCONTINUED | OUTPATIENT
Start: 2020-10-23 | End: 2020-10-23 | Stop reason: SURG

## 2020-10-23 RX ORDER — GLYCOPYRROLATE 0.2 MG/ML
INJECTION INTRAMUSCULAR; INTRAVENOUS AS NEEDED
Status: DISCONTINUED | OUTPATIENT
Start: 2020-10-23 | End: 2020-10-23 | Stop reason: SURG

## 2020-10-23 RX ADMIN — PROPOFOL 20 MG: 10 INJECTION, EMULSION INTRAVENOUS at 11:07

## 2020-10-23 RX ADMIN — FENTANYL CITRATE 50 MCG: 0.05 INJECTION, SOLUTION INTRAMUSCULAR; INTRAVENOUS at 11:15

## 2020-10-23 RX ADMIN — ACETAMINOPHEN 500 MG: 500 TABLET, FILM COATED ORAL at 08:17

## 2020-10-23 RX ADMIN — FENTANYL CITRATE 50 MCG: 0.05 INJECTION, SOLUTION INTRAMUSCULAR; INTRAVENOUS at 11:34

## 2020-10-23 RX ADMIN — SODIUM CHLORIDE, POTASSIUM CHLORIDE, SODIUM LACTATE AND CALCIUM CHLORIDE 9 ML/HR: 600; 310; 30; 20 INJECTION, SOLUTION INTRAVENOUS at 08:17

## 2020-10-23 RX ADMIN — GLYCOPYRROLATE 0.2 MG: 0.2 INJECTION INTRAMUSCULAR; INTRAVENOUS at 10:25

## 2020-10-23 RX ADMIN — HYDROMORPHONE HYDROCHLORIDE 0.5 MG: 2 INJECTION, SOLUTION INTRAMUSCULAR; INTRAVENOUS; SUBCUTANEOUS at 11:04

## 2020-10-23 RX ADMIN — KETOROLAC TROMETHAMINE 30 MG: 30 INJECTION, SOLUTION INTRAMUSCULAR; INTRAVENOUS at 10:55

## 2020-10-23 RX ADMIN — HYDROMORPHONE HYDROCHLORIDE 0.5 MG: 2 INJECTION, SOLUTION INTRAMUSCULAR; INTRAVENOUS; SUBCUTANEOUS at 10:39

## 2020-10-23 RX ADMIN — SODIUM CHLORIDE, POTASSIUM CHLORIDE, SODIUM LACTATE AND CALCIUM CHLORIDE: 600; 310; 30; 20 INJECTION, SOLUTION INTRAVENOUS at 11:12

## 2020-10-23 RX ADMIN — ROCURONIUM BROMIDE 50 MG: 10 INJECTION, SOLUTION INTRAVENOUS at 10:17

## 2020-10-23 RX ADMIN — PROPOFOL 50 MG: 10 INJECTION, EMULSION INTRAVENOUS at 10:33

## 2020-10-23 RX ADMIN — Medication 4 MG: at 10:57

## 2020-10-23 RX ADMIN — ONDANSETRON HYDROCHLORIDE 4 MG: 2 SOLUTION INTRAMUSCULAR; INTRAVENOUS at 10:25

## 2020-10-23 RX ADMIN — CLINDAMYCIN PHOSPHATE 900 MG: 18 INJECTION, SOLUTION INTRAMUSCULAR; INTRAVENOUS at 10:22

## 2020-10-23 RX ADMIN — PROPOFOL 200 MG: 10 INJECTION, EMULSION INTRAVENOUS at 10:17

## 2020-10-23 RX ADMIN — HYDROCODONE BITARTRATE AND ACETAMINOPHEN 1 TABLET: 5; 325 TABLET ORAL at 11:15

## 2020-10-23 RX ADMIN — MIDAZOLAM 1 MG: 1 INJECTION INTRAMUSCULAR; INTRAVENOUS at 08:18

## 2020-10-23 RX ADMIN — GLYCOPYRROLATE 0.5 MG: 0.2 INJECTION INTRAMUSCULAR; INTRAVENOUS at 10:56

## 2020-10-23 RX ADMIN — DEXAMETHASONE SODIUM PHOSPHATE 8 MG: 10 INJECTION INTRAMUSCULAR; INTRAVENOUS at 10:23

## 2020-10-23 RX ADMIN — FENTANYL CITRATE 100 MCG: 50 INJECTION INTRAMUSCULAR; INTRAVENOUS at 10:17

## 2020-10-23 RX ADMIN — LIDOCAINE HYDROCHLORIDE 80 MG: 20 INJECTION, SOLUTION INFILTRATION; PERINEURAL at 10:17

## 2020-10-23 NOTE — ANESTHESIA POSTPROCEDURE EVALUATION
Patient: Trav Steward III    Procedure Summary     Date: 10/23/20 Room / [facility]:  [facility]  [facility]    Anesthesia Start: 1011 Anesthesia Stop: 1113    Procedure: INGUINAL HERNIA REPAIR LAPAROSCOPIC RIGHT (Right Abdomen) Diagnosis:       Right inguinal hernia      (Right inguinal hernia [K40.90])    Surgeon: Mynor Sears MD Provider: Usama Arriaza MD    Anesthesia Type: general ASA Status: 2          Anesthesia Type: general    Vitals  Vitals Value Taken Time   /70 10/23/20 1145   Temp 36.1 °C (97 °F) 10/23/20 1145   Pulse 69 10/23/20 1145   Resp 16 10/23/20 1145   SpO2 99 % 10/23/20 1145           Post Anesthesia Care and Evaluation    Patient location during evaluation: bedside  Patient participation: complete - patient participated  Level of consciousness: awake  Pain score: 1  Pain management: adequate  Airway patency: patent  Anesthetic complications: No anesthetic complications  PONV Status: controlled  Cardiovascular status: acceptable  Respiratory status: acceptable  Hydration status: acceptable    Comments: -------------------------              10/23/20                    1145        -------------------------   BP:         121/70        Pulse:        69          Resp:         16          Temp:   36.1 °C (97 °F)   SpO2:         99%        -------------------------

## 2020-10-23 NOTE — DISCHARGE INSTRUCTIONS
Dr. Mynor Sears  [address]  [place], [place] [address] [phone]    Discharge Instructions for Hernia Surgery    1. Go home, rest and take it easy today; however, you should get up and move about several times today to reduce the risk of developing a clot in your legs.      2. You may experience some dizziness or memory loss from the anesthesia.  This may last for the next 24 hours.  Someone should plan on staying with you for the first 24 hours for your safety.    3. Do not make any important legal decisions or sign any legal papers for the next 24 hours.      4. Eat and drink lightly today.  Start off with liquids, jello, soup, crackers or other bland foods at first. You may advance your diet tomorrow as tolerated as long as you do not experience any nausea or vomiting.     5. If skin glue (Dermabond) was used, your incisions are protected and covered.  The invisible glue will dissolve on its own as your incision heals. If dressings were used, you may remove your outer dressings in 3 days.  The white tapes called steri-strips should stay in place.  They will fall off on their own in 1-2 weeks.  Do not worry if they come off sooner.      6. If dressings were used, you may notice some bleeding/drainage on your outer dressings. A little bloody drainage is normal. If the bleeding/drainage is such that the bandage cannot absorb it, remove the dressing, apply clean gauze and apply firm pressure for a full 15 minutes.  If the bleeding continues, please call me.    7. You may shower tomorrow allowing water to run over the incisions; however, do not scrub the incisions.  No tub baths until your incisions are completely healed.      8. No lifting > 20 lbs. until you are seen at your follow-up visit.         9. You have received a prescription for a narcotic pain medicine, as you will have some pain following surgery.   You will not be totally pain free, but your pain medicine should make the pain  tolerable.  Please take your pain medicine as prescribed and always take your pills with food to prevent nausea. If you are having severe pain that cannot be controlled by the pain medicine, please contact me.      10. You have also received a prescription for an anti-nausea medicine.  Please take this as prescribed for any nausea or vomiting.  Nausea could be a result of the anesthesia or a result of the narcotic pain medicine.  If you experience severe nausea and vomiting that cannot be controlled by the nausea medicine, please call me.      11. If you had a laparoscopic surgery, it is not unusual to experience pain/discomfort in your shoulders or under your ribs after surgery.  It is from the gas used during the laparoscopic procedure and usually lasts 1-3 days.  The prescription pain medicine is used to treat the surgical pain and does not typically alleviate this “gassy” pain.     12. No driving for 24 hours and for as long as you are taking your prescription pain medicine.    13. You will need to call the office at [phone] to schedule a follow-up appointment in 6-10 days.     14. Remember to contact me for any of the following:    • Fever > 101 degrees  • Severe pain that cannot be controlled by taking your pain pills  • Severe nausea or vomiting that cannot be controlled by taking your nausea pills  • Significant bleeding of your incisions  • Drainage that has a bad smell or is yellow or green in appearance  • Any other questions or concerns      Additional Instruction for Inguinal Hernia Patients Only    1. If you did not urinate at the hospital after your surgery or if you feel the need to urinate and cannot, this will necessitate a return to the Emergency Room for placement of a urinary catheter.  You should also notify me as well.  As a rule, you should be able to empty your bladder within 4-6 hours after discharge from the hospital.      2. You may notice some scrotal bruising and/or swelling. A scrotal  support or briefs as well as ice packs may be used to alleviate discomfort.

## 2020-10-23 NOTE — ANESTHESIA PROCEDURE NOTES
Airway  Urgency: elective    Date/Time: 10/23/2020 10:19 AM  Airway not difficult    General Information and Staff    Patient location during procedure: OR  CRNA: Tomasa Unger CRNA    Indications and Patient Condition  Indications for airway management: airway protection    Preoxygenated: yes  Mask difficulty assessment: 1 - vent by mask    Final Airway Details  Final airway type: endotracheal airway      Successful airway: ETT  Cuffed: yes   Successful intubation technique: direct laryngoscopy  Endotracheal tube insertion site: oral  Blade: Bridgette  Blade size: 3  ETT size (mm): 7.0  Cormack-Lehane Classification: grade I - full view of glottis  Placement verified by: chest auscultation and capnometry   Measured from: lips  ETT/EBT  to lips (cm): 22  Number of attempts at approach: 1  Assessment: lips, teeth, and gum same as pre-op and atraumatic intubation    Additional Comments   ett cuff up at [facility]OP

## 2020-10-23 NOTE — OP NOTE
PREOPERATIVE DIAGNOSIS:  Right inguinal hernia    POSTOPERATIVE DIAGNOSIS (FINDINGS):  Indirect right inguinal hernia    PROCEDURE:  Laparoscopic right inguinal hernia repair    SURGEON:  Mynor Sears MD    ASSISTANT:  Jos Bunch    ANESTHESIA:  General    EBL:  Minimal    SPECIMEN(S):  none    DESCRIPTION:  In supine position under general anesthetic prepped and draped usual sterile manner.  Small incision made above the umbilicus and Veress needle inserted with upwards traction on the abdominal wall.  Abdomen insufflated 15 mmHg.    5 mm Optiview trocar inserted followed by left midabdomen 10 and right mid abdomen 5 mm trochars.    Peritoneum opened over the internal ring and moderately large indirect hernia sac reduced.  Davis's ligament exposed.    Peritoneum stripped from the vas deferens and spermatic vessels.  With inguinal floor thus cleared a 6 x 6 inch polypropylene mesh tailored to size and tacked with absorbable tacks to Davis's ligament, rectus abdominis, and laterally above the iliopubic tract.  This resulted in good flat apposition of the mesh to the inguinal floor and good coverage of all real and potential hernia defects.  Good hemostasis was noted.  Peritoneum was closed completely with clips.  CO2 was released.  Fascia of the 10 mm trocar site closed with 0 Vicryl.  Skin edges 5-0 Vicryl subcuticular followed by Dermabond.  Tolerated well, stable to recovery room.    Mynor Sears M.D.

## 2020-10-23 NOTE — ANESTHESIA PREPROCEDURE EVALUATION
Anesthesia Evaluation     no history of anesthetic complications:  NPO Solid Status: > 8 hours             Airway   Mallampati: II  TM distance: >3 FB  Neck ROM: full  No difficulty expected  Dental - normal exam     Pulmonary - negative pulmonary ROS and normal exam   Cardiovascular     Rhythm: regular    (+) valvular problems/murmurs MVP,     ROS comment: PFO    Neuro/Psych- negative ROS  GI/Hepatic/Renal/Endo - negative ROS     Musculoskeletal (-) negative ROS    Abdominal    Substance History      OB/GYN          Other                        Anesthesia Plan    ASA 2     general   (  D/W R&B of GA including but not limited to: heart, lung, liver, kidney, neurologic problems, positioning injuries, dental damage, corneal abrasion and TMJ.  .)  intravenous induction

## 2020-10-24 ENCOUNTER — HOSPITAL ENCOUNTER (EMERGENCY)
Facility: HOSPITAL | Age: 30
Discharge: HOME | End: 2020-10-24
Attending: EMERGENCY MEDICINE | Admitting: EMERGENCY MEDICINE

## 2020-10-24 VITALS
RESPIRATION RATE: 18 BRPM | BODY MASS INDEX: 25.03 KG/M2 | SYSTOLIC BLOOD PRESSURE: 114 MMHG | TEMPERATURE: 97.6 F | HEIGHT: 74 IN | WEIGHT: 195 LBS | DIASTOLIC BLOOD PRESSURE: 59 MMHG | HEART RATE: 56 BPM | OXYGEN SATURATION: 98 %

## 2020-10-24 DIAGNOSIS — R10.9 POSTOPERATIVE ABDOMINAL PAIN: Primary | ICD-10-CM

## 2020-10-24 DIAGNOSIS — G89.18 POSTOPERATIVE ABDOMINAL PAIN: Primary | ICD-10-CM

## 2020-10-24 LAB
ALBUMIN SERPL-MCNC: 3.9 G/DL (ref 3.5–5.2)
ALBUMIN/GLOB SERPL: 2.1 G/DL
ALP SERPL-CCNC: 70 U/L (ref 39–117)
ALT SERPL W P-5'-P-CCNC: 13 U/L (ref 1–41)
ANION GAP SERPL CALCULATED.3IONS-SCNC: 6.8 MMOL/L (ref 5–15)
AST SERPL-CCNC: 12 U/L (ref 1–40)
BASOPHILS # BLD AUTO: 0.03 10*3/MM3 (ref 0–0.2)
BASOPHILS NFR BLD AUTO: 0.3 % (ref 0–1.5)
BILIRUB SERPL-MCNC: 0.5 MG/DL (ref 0–1.2)
BUN SERPL-MCNC: 11 MG/DL (ref 6–20)
BUN/CREAT SERPL: 12.4 (ref 7–25)
CALCIUM SPEC-SCNC: 9.1 MG/DL (ref 8.6–10.5)
CHLORIDE SERPL-SCNC: 106 MMOL/L (ref 98–107)
CO2 SERPL-SCNC: 28.2 MMOL/L (ref 22–29)
CREAT SERPL-MCNC: 0.89 MG/DL (ref 0.76–1.27)
DEPRECATED RDW RBC AUTO: 41.1 FL (ref 37–54)
EOSINOPHIL # BLD AUTO: 0.02 10*3/MM3 (ref 0–0.4)
EOSINOPHIL NFR BLD AUTO: 0.2 % (ref 0.3–6.2)
ERYTHROCYTE [DISTWIDTH] IN BLOOD BY AUTOMATED COUNT: 12.6 % (ref 12.3–15.4)
GFR SERPL CREATININE-BSD FRML MDRD: 102 ML/MIN/1.73
GFR SERPL CREATININE-BSD FRML MDRD: 123 ML/MIN/1.73
GLOBULIN UR ELPH-MCNC: 1.9 GM/DL
GLUCOSE SERPL-MCNC: 95 MG/DL (ref 65–99)
HCT VFR BLD AUTO: 40.9 % (ref 37.5–51)
HGB BLD-MCNC: 13.9 G/DL (ref 13–17.7)
IMM GRANULOCYTES # BLD AUTO: 0.02 10*3/MM3 (ref 0–0.05)
IMM GRANULOCYTES NFR BLD AUTO: 0.2 % (ref 0–0.5)
LYMPHOCYTES # BLD AUTO: 2.3 10*3/MM3 (ref 0.7–3.1)
LYMPHOCYTES NFR BLD AUTO: 23.2 % (ref 19.6–45.3)
MCH RBC QN AUTO: 30.4 PG (ref 26.6–33)
MCHC RBC AUTO-ENTMCNC: 34 G/DL (ref 31.5–35.7)
MCV RBC AUTO: 89.5 FL (ref 79–97)
MONOCYTES # BLD AUTO: 0.82 10*3/MM3 (ref 0.1–0.9)
MONOCYTES NFR BLD AUTO: 8.3 % (ref 5–12)
NEUTROPHILS NFR BLD AUTO: 6.74 10*3/MM3 (ref 1.7–7)
NEUTROPHILS NFR BLD AUTO: 67.8 % (ref 42.7–76)
NRBC BLD AUTO-RTO: 0 /100 WBC (ref 0–0.2)
PLATELET # BLD AUTO: 193 10*3/MM3 (ref 140–450)
PMV BLD AUTO: 10.2 FL (ref 6–12)
POTASSIUM SERPL-SCNC: 3.7 MMOL/L (ref 3.5–5.2)
PROT SERPL-MCNC: 5.8 G/DL (ref 6–8.5)
RBC # BLD AUTO: 4.57 10*6/MM3 (ref 4.14–5.8)
SODIUM SERPL-SCNC: 141 MMOL/L (ref 136–145)
WBC # BLD AUTO: 9.93 10*3/MM3 (ref 3.4–10.8)

## 2020-10-24 PROCEDURE — 25010000002 ONDANSETRON PER 1 MG: Performed by: EMERGENCY MEDICINE

## 2020-10-24 PROCEDURE — 51798 US URINE CAPACITY MEASURE: CPT

## 2020-10-24 PROCEDURE — 99283 EMERGENCY DEPT VISIT LOW MDM: CPT

## 2020-10-24 PROCEDURE — 96375 TX/PRO/DX INJ NEW DRUG ADDON: CPT

## 2020-10-24 PROCEDURE — 96374 THER/PROPH/DIAG INJ IV PUSH: CPT

## 2020-10-24 PROCEDURE — 25010000002 MORPHINE PER 10 MG: Performed by: EMERGENCY MEDICINE

## 2020-10-24 PROCEDURE — 85025 COMPLETE CBC W/AUTO DIFF WBC: CPT | Performed by: EMERGENCY MEDICINE

## 2020-10-24 PROCEDURE — 80053 COMPREHEN METABOLIC PANEL: CPT | Performed by: EMERGENCY MEDICINE

## 2020-10-24 RX ORDER — ONDANSETRON 2 MG/ML
4 INJECTION INTRAMUSCULAR; INTRAVENOUS ONCE
Status: COMPLETED | OUTPATIENT
Start: 2020-10-24 | End: 2020-10-24

## 2020-10-24 RX ORDER — MORPHINE SULFATE 2 MG/ML
4 INJECTION, SOLUTION INTRAMUSCULAR; INTRAVENOUS ONCE
Status: COMPLETED | OUTPATIENT
Start: 2020-10-24 | End: 2020-10-24

## 2020-10-24 RX ADMIN — SODIUM CHLORIDE 1000 ML: 9 INJECTION, SOLUTION INTRAVENOUS at 10:35

## 2020-10-24 RX ADMIN — MORPHINE SULFATE 4 MG: 2 INJECTION, SOLUTION INTRAMUSCULAR; INTRAVENOUS at 10:51

## 2020-10-24 RX ADMIN — ONDANSETRON HYDROCHLORIDE 4 MG: 2 SOLUTION INTRAMUSCULAR; INTRAVENOUS at 10:48

## 2020-10-24 NOTE — ED PROVIDER NOTES
EMERGENCY DEPARTMENT ENCOUNTER    Room Number:  39/39  Date of encounter:  10/24/2020  PCP: Jhony Mcmahan Jr., MD  Historian: Patient      HPI:  Chief Complaint: Lower abdominal pain,? urinary retention  A complete HPI/ROS/PMH/PSH/SH/FH are unobtainable due to: N/A    Context: Trav Steward III is a 30 y.o. male who presents to the ED c/o lower abdominal pain since his hernia repair yesterday.  He reports some difficulty emptying his bladder.  No fevers or chills.  No nausea or vomiting.  No cough, congestion or trouble breathing.      The patient was placed in a mask in triage, hand hygiene was performed before and after my interaction with the patient.  I wore a mask, safety glasses and gloves during my entire interaction with the patient.    PAST MEDICAL HISTORY  Active Ambulatory Problems     Diagnosis Date Noted   • Right inguinal hernia 2020     Resolved Ambulatory Problems     Diagnosis Date Noted   • No Resolved Ambulatory Problems     Past Medical History:   Diagnosis Date   • Inguinal hernia    • Mitral valve prolapse    • PFO (patent foramen ovale)    • Reduced libido    • Right shoulder pain    • Staph infection    • Tonsil stone          PAST SURGICAL HISTORY  Past Surgical History:   Procedure Laterality Date   • NO PAST SURGERIES           FAMILY HISTORY  Family History   Problem Relation Age of Onset   • Hypertension Mother    • Diabetes Maternal Grandfather    • Malig Hyperthermia Neg Hx          SOCIAL HISTORY  Social History     Socioeconomic History   • Marital status:      Spouse name: Not on file   • Number of children: Not on file   • Years of education: Not on file   • Highest education level: Not on file   Tobacco Use   • Smoking status: Former Smoker     Quit date: 2013     Years since quittin.8   • Smokeless tobacco: Never Used   • Tobacco comment: caffeine use   Substance and Sexual Activity   • Alcohol use: Yes     Comment: Socially   • Drug use: Defer    • Sexual activity: Defer   Social History Narrative    ** Merged History Encounter **              ALLERGIES  Penicillins and Amoxicillin        REVIEW OF SYSTEMS  Review of Systems   Constitutional: Negative for chills and fever.   Gastrointestinal: Positive for abdominal pain. Negative for diarrhea, nausea and vomiting.   Genitourinary: Positive for difficulty urinating.          PHYSICAL EXAM    I have reviewed the triage vital signs and nursing notes.    ED Triage Vitals   Temp Heart Rate Resp BP SpO2   10/24/20 0857 10/24/20 0857 10/24/20 0857 10/24/20 0921 10/24/20 0857   97.6 °F (36.4 °C) 62 18 125/71 97 %      Temp src Heart Rate Source Patient Position BP Location FiO2 (%)   10/24/20 0857 10/24/20 0921 10/24/20 0921 10/24/20 0921 --   Tympanic Monitor Sitting Right arm        Physical Exam   Constitutional: Pt. is oriented to person, place, and time and well-developed, well-nourished, and in no distress. No distress.   HENT: Normocephalic, AT.  Neck: Normal range of motion.  No JVD present. No tracheal deviation present.   Cardiovascular: Normal rate, regular rhythm and normal heart sounds. Exam reveals no gallop and no friction rub.   No murmur heard.  Pulmonary/Chest: Effort normal and breath sounds normal. No stridor. No respiratory distress. No wheezes, no rales.   Abdominal: Soft. Bowel sounds are normal. No distension. There is appropriate postoperative tenderness. There is no rebound and no guarding.  Incisions are clean dry and intact  Musculoskeletal: Normal range of motion.   Neurological: Pt. is alert and oriented to person, place, and time.  GCS score is 15.   Skin: Skin is warm and dry. No rash noted. Pt. is not diaphoretic. No erythema.     Nursing note and vitals reviewed.        LAB RESULTS  Recent Results (from the past 24 hour(s))   Comprehensive Metabolic Panel    Collection Time: 10/24/20 10:31 AM    Specimen: Blood   Result Value Ref Range    Glucose 95 65 - 99 mg/dL    BUN 11 6 - 20  mg/dL    Creatinine 0.89 0.76 - 1.27 mg/dL    Sodium 141 136 - 145 mmol/L    Potassium 3.7 3.5 - 5.2 mmol/L    Chloride 106 98 - 107 mmol/L    CO2 28.2 22.0 - 29.0 mmol/L    Calcium 9.1 8.6 - 10.5 mg/dL    Total Protein 5.8 (L) 6.0 - 8.5 g/dL    Albumin 3.90 3.50 - 5.20 g/dL    ALT (SGPT) 13 1 - 41 U/L    AST (SGOT) 12 1 - 40 U/L    Alkaline Phosphatase 70 39 - 117 U/L    Total Bilirubin 0.5 0.0 - 1.2 mg/dL    eGFR Non African Amer 102 >60 mL/min/1.73    eGFR  African Amer 123 >60 mL/min/1.73    Globulin 1.9 gm/dL    A/G Ratio 2.1 g/dL    BUN/Creatinine Ratio 12.4 7.0 - 25.0    Anion Gap 6.8 5.0 - 15.0 mmol/L   CBC Auto Differential    Collection Time: 10/24/20 10:31 AM    Specimen: Blood   Result Value Ref Range    WBC 9.93 3.40 - 10.80 10*3/mm3    RBC 4.57 4.14 - 5.80 10*6/mm3    Hemoglobin 13.9 13.0 - 17.7 g/dL    Hematocrit 40.9 37.5 - 51.0 %    MCV 89.5 79.0 - 97.0 fL    MCH 30.4 26.6 - 33.0 pg    MCHC 34.0 31.5 - 35.7 g/dL    RDW 12.6 12.3 - 15.4 %    RDW-SD 41.1 37.0 - 54.0 fl    MPV 10.2 6.0 - 12.0 fL    Platelets 193 140 - 450 10*3/mm3    Neutrophil % 67.8 42.7 - 76.0 %    Lymphocyte % 23.2 19.6 - 45.3 %    Monocyte % 8.3 5.0 - 12.0 %    Eosinophil % 0.2 (L) 0.3 - 6.2 %    Basophil % 0.3 0.0 - 1.5 %    Immature Grans % 0.2 0.0 - 0.5 %    Neutrophils, Absolute 6.74 1.70 - 7.00 10*3/mm3    Lymphocytes, Absolute 2.30 0.70 - 3.10 10*3/mm3    Monocytes, Absolute 0.82 0.10 - 0.90 10*3/mm3    Eosinophils, Absolute 0.02 0.00 - 0.40 10*3/mm3    Basophils, Absolute 0.03 0.00 - 0.20 10*3/mm3    Immature Grans, Absolute 0.02 0.00 - 0.05 10*3/mm3    nRBC 0.0 0.0 - 0.2 /100 WBC       Ordered the above labs and independently reviewed the results.        RADIOLOGY  No Radiology Exams Resulted Within Past 24 Hours    I ordered the above noted radiological studies. Reviewed by me and discussed with radiologist.  See dictation for official radiology interpretation.      PROCEDURES    Procedures      MEDICATIONS GIVEN IN  "ER    Medications   sodium chloride 0.9 % bolus 1,000 mL (0 mL Intravenous Stopped 10/24/20 1215)   morphine injection 4 mg (4 mg Intravenous Given 10/24/20 1051)   ondansetron (ZOFRAN) injection 4 mg (4 mg Intravenous Given 10/24/20 1048)         PROGRESS, DATA ANALYSIS, CONSULTS, AND MEDICAL DECISION MAKING    Any/all labs have been independently reviewed by me.  Any/all radiology studies have been reviewed by me and discussed with radiologist dictating the report.   EKG's independently viewed and interpreted by me.  Discussion below represents my analysis of pertinent findings related to patient's condition, differential diagnosis, treatment plan and final disposition.      ED Course as of Oct 24 1206   Sat Oct 24, 2020   0930 Prior record review: The patient had right inguinal hernia repair here yesterday with Dr. Sears.    [WC]   1021 Bladder scan = 0 X3.  Patient relays to nurse that he hasn't been drinking d/t being afraid that he would \"put pressure on his bladder.\"  Will check routine labs.  Normal saline for hydration.  Morphine and Zofran for abdominal pain    [WC]   1111 BUN: 11 [WC]   1111 Creatinine: 0.89 [WC]   1111 Platelets: 193 [WC]   1111 Hematocrit: 40.9 [WC]   1111 Hemoglobin: 13.9 [WC]   1111 WBC: 9.93 [WC]   1130 Renal function is fine.  CBC is unremarkable.    [WC]      ED Course User Index  [WC] Gopal Rivera MD       AS OF 15:01 EDT VITALS:    BP - 114/59  HR - 56  TEMP - 97.6 °F (36.4 °C) (Tympanic)  02 SATS - 98%        DIAGNOSIS  Final diagnoses:   Postoperative abdominal pain         DISPOSITION  Discharged           Gopal Rivera MD  10/24/20 1501    "

## 2020-10-24 NOTE — ED TRIAGE NOTES
Pt coming to ER for lower abd pain/ right groin pain since yesterday. Pt states he had inguinal hernia sx yesterday and since then the pain has increased and he has not been able to empty his bladder.  Pt and RN wearing mask upon triage.

## 2020-10-29 ENCOUNTER — OFFICE VISIT (OUTPATIENT)
Dept: SURGERY | Facility: CLINIC | Age: 30
End: 2020-10-29

## 2020-10-29 DIAGNOSIS — Z09 FOLLOW UP: Primary | ICD-10-CM

## 2020-10-29 PROCEDURE — 99024 POSTOP FOLLOW-UP VISIT: CPT | Performed by: SURGERY

## 2020-10-31 NOTE — PROGRESS NOTES
Postoperative visit    Laparoscopic right inguinal hernia repair 10/23/2020    Office visit: Incisions are healing well and he is doing well, reporting no problems from the surgery.  Activity restrictions discussed.  Follow-up as needed.

## 2020-12-02 ENCOUNTER — TELEPHONE (OUTPATIENT)
Dept: GASTROENTEROLOGY | Facility: CLINIC | Age: 30
End: 2020-12-02

## 2020-12-02 ENCOUNTER — OFFICE VISIT (OUTPATIENT)
Dept: GASTROENTEROLOGY | Facility: CLINIC | Age: 30
End: 2020-12-02

## 2020-12-02 VITALS — WEIGHT: 194 LBS | BODY MASS INDEX: 24.9 KG/M2 | HEIGHT: 74 IN

## 2020-12-02 DIAGNOSIS — K62.5 RECTAL BLEEDING: ICD-10-CM

## 2020-12-02 DIAGNOSIS — R19.5 LOOSE STOOLS: ICD-10-CM

## 2020-12-02 DIAGNOSIS — R10.9 ABDOMINAL CRAMPS: Primary | ICD-10-CM

## 2020-12-02 LAB
ALBUMIN SERPL-MCNC: 4.4 G/DL (ref 3.5–5.2)
ALBUMIN/GLOB SERPL: 2 G/DL
ALP SERPL-CCNC: 74 U/L (ref 39–117)
ALT SERPL-CCNC: 20 U/L (ref 1–41)
AST SERPL-CCNC: 11 U/L (ref 1–40)
BASOPHILS # BLD AUTO: 0.03 10*3/MM3 (ref 0–0.2)
BASOPHILS NFR BLD AUTO: 0.5 % (ref 0–1.5)
BILIRUB SERPL-MCNC: 0.8 MG/DL (ref 0–1.2)
BUN SERPL-MCNC: 14 MG/DL (ref 6–20)
BUN/CREAT SERPL: 13 (ref 7–25)
CALCIUM SERPL-MCNC: 9.3 MG/DL (ref 8.6–10.5)
CHLORIDE SERPL-SCNC: 104 MMOL/L (ref 98–107)
CO2 SERPL-SCNC: 28.3 MMOL/L (ref 22–29)
CREAT SERPL-MCNC: 1.08 MG/DL (ref 0.76–1.27)
CRP SERPL-MCNC: 0.03 MG/DL (ref 0–0.5)
EOSINOPHIL # BLD AUTO: 0.06 10*3/MM3 (ref 0–0.4)
EOSINOPHIL NFR BLD AUTO: 1.1 % (ref 0.3–6.2)
ERYTHROCYTE [DISTWIDTH] IN BLOOD BY AUTOMATED COUNT: 12.3 % (ref 12.3–15.4)
ERYTHROCYTE [SEDIMENTATION RATE] IN BLOOD BY WESTERGREN METHOD: 3 MM/HR (ref 0–15)
GLOBULIN SER CALC-MCNC: 2.2 GM/DL
GLUCOSE SERPL-MCNC: 83 MG/DL (ref 65–99)
HCT VFR BLD AUTO: 45.8 % (ref 37.5–51)
HGB BLD-MCNC: 15.7 G/DL (ref 13–17.7)
IMM GRANULOCYTES # BLD AUTO: 0.01 10*3/MM3 (ref 0–0.05)
IMM GRANULOCYTES NFR BLD AUTO: 0.2 % (ref 0–0.5)
LYMPHOCYTES # BLD AUTO: 1.92 10*3/MM3 (ref 0.7–3.1)
LYMPHOCYTES NFR BLD AUTO: 34.7 % (ref 19.6–45.3)
MCH RBC QN AUTO: 30.1 PG (ref 26.6–33)
MCHC RBC AUTO-ENTMCNC: 34.3 G/DL (ref 31.5–35.7)
MCV RBC AUTO: 87.7 FL (ref 79–97)
MONOCYTES # BLD AUTO: 0.44 10*3/MM3 (ref 0.1–0.9)
MONOCYTES NFR BLD AUTO: 7.9 % (ref 5–12)
NEUTROPHILS # BLD AUTO: 3.08 10*3/MM3 (ref 1.7–7)
NEUTROPHILS NFR BLD AUTO: 55.6 % (ref 42.7–76)
NRBC BLD AUTO-RTO: 0 /100 WBC (ref 0–0.2)
PLATELET # BLD AUTO: 201 10*3/MM3 (ref 140–450)
POTASSIUM SERPL-SCNC: 4.5 MMOL/L (ref 3.5–5.2)
PROT SERPL-MCNC: 6.6 G/DL (ref 6–8.5)
RBC # BLD AUTO: 5.22 10*6/MM3 (ref 4.14–5.8)
SODIUM SERPL-SCNC: 139 MMOL/L (ref 136–145)
WBC # BLD AUTO: 5.54 10*3/MM3 (ref 3.4–10.8)

## 2020-12-02 PROCEDURE — 99204 OFFICE O/P NEW MOD 45 MIN: CPT | Performed by: NURSE PRACTITIONER

## 2020-12-02 RX ORDER — DICYCLOMINE HYDROCHLORIDE 10 MG/1
10 CAPSULE ORAL
Qty: 120 CAPSULE | Refills: 3 | Status: SHIPPED | OUTPATIENT
Start: 2020-12-02 | End: 2022-08-09 | Stop reason: SDUPTHER

## 2020-12-02 NOTE — PROGRESS NOTES
Chief Complaint   Patient presents with   • Rectal Bleeding   • Abdominal Cramping   • Diarrhea     HPI    Trav Steward III is a  30 y.o. male here establish care as a new patient for episodic abdominal cramps, rectal bleeding and diarrhea.  This patient will also follow with Dr. Castillo.    This patient was recently emergency room for complaints of lower abdominal pain since having a right inguinal hernia repair the day before.  Is also having issues emptying his bladder.  Reviewed ER evaluation.  CBC and CMP was normal.  Patient was treated IV medications and IV fluids.    On visit today the patient reports struggling with episodic lower abdominal cramps that can be intense in nature relieved with defecation associated with loose stools or diarrhea as well as bright red blood per rectum mainly on the toilet paper for the past 10 years.  He has never had a formal work-up for symptoms.  He has never had a colonoscopy.  Episodes occur about once a week generally triggered by eating.  Once loose stools occur his abdominal cramps marissa.    His weight has remained stable.  No nausea or vomiting associated with episodes.  He denies issues with acid reflux or heartburn.  No dysphagia.  His appetite is excellent.    He does not smoke.  He rarely drinks alcohol.  He rarely takes NSAIDs.  He denies family history of colon cancer but does report a history of Crohn's disease in his maternal grandmother.  He still has his gallbladder.    Past Medical History:   Diagnosis Date   • Inguinal hernia    • Mitral valve prolapse    • PFO (patent foramen ovale)     FOLLOWED BY .. LAST ECHO 2019, EKG 2018   • Reduced libido    • Right shoulder pain    • Staph infection 2007    ON FACE    • Tonsil stone        Past Surgical History:   Procedure Laterality Date   • INGUINAL HERNIA REPAIR Right 10/23/2020    Procedure: INGUINAL HERNIA REPAIR LAPAROSCOPIC RIGHT;  Surgeon: Mynor Sears MD;  Location[place] [facility] [place] [facility] [place];  Service:  General;  Laterality: Right;   • NO PAST SURGERIES         Scheduled Meds:  No outpatient encounter medications on file as of 2020.     No facility-administered encounter medications on file as of 2020.        Continuous Infusions:No current facility-administered medications for this visit.       PRN Meds:.    Allergies   Allergen Reactions   • Penicillins Unknown - High Severity     As a child   • Amoxicillin Nausea Only       Social History     Socioeconomic History   • Marital status:      Spouse name: Not on file   • Number of children: Not on file   • Years of education: Not on file   • Highest education level: Not on file   Tobacco Use   • Smoking status: Former Smoker     Quit date: 2013     Years since quittin.9   • Smokeless tobacco: Never Used   • Tobacco comment: caffeine use   Substance and Sexual Activity   • Alcohol use: Yes     Comment: Socially   • Drug use: Defer   • Sexual activity: Defer   Social History Narrative    ** Merged History Encounter **            Family History   Problem Relation Age of Onset   • Hypertension Mother    • Diabetes Maternal Grandfather    • Crohn's disease Paternal Grandmother    • Malig Hyperthermia Neg Hx        Review of Systems   Constitutional: Negative for activity change, appetite change, fatigue, fever and unexpected weight change.   HENT: Negative for trouble swallowing and voice change.    Eyes: Negative.    Respiratory: Negative for apnea, cough, choking, chest tightness, shortness of breath and wheezing.    Cardiovascular: Negative for chest pain, palpitations and leg swelling.   Gastrointestinal: Positive for anal bleeding and diarrhea. Negative for abdominal distention, abdominal pain, blood in stool, constipation, nausea, rectal pain and vomiting.   Endocrine: Negative.    Genitourinary: Negative.    Musculoskeletal: Negative.    Skin: Negative.    Allergic/Immunologic: Negative.    Neurological: Negative.    Hematological:  Negative.    Psychiatric/Behavioral: Negative.        There were no vitals filed for this visit.    Physical Exam  Constitutional:       Appearance: He is well-developed.   HENT:      Head: Normocephalic.      Nose: Nose normal.   Eyes:      Pupils: Pupils are equal, round, and reactive to light.   Neck:      Musculoskeletal: Normal range of motion.   Cardiovascular:      Rate and Rhythm: Normal rate and regular rhythm.      Heart sounds: Normal heart sounds.   Pulmonary:      Effort: Pulmonary effort is normal. No respiratory distress.      Breath sounds: Normal breath sounds. No wheezing.   Abdominal:      General: Bowel sounds are normal. There is no distension.      Palpations: Abdomen is soft. There is no mass.      Tenderness: There is no abdominal tenderness. There is no guarding.      Hernia: No hernia is present.   Musculoskeletal: Normal range of motion.   Skin:     General: Skin is warm and dry.      Capillary Refill: Capillary refill takes less than 2 seconds.   Neurological:      Mental Status: He is alert and oriented to person, place, and time.   Psychiatric:         Behavior: Behavior normal.     Problem list    Rectal bleeding  Lower abdominal cramping  Diarrhea  Family history of Crohn's disease    Assessment/plan    Pleasant 30-year-old male seen today to establish care as new patient for a decade long history of episodic abdominal cramping with rectal bleeding and loose stools.  He has never had a formal work-up for his symptoms.    I am concerned that he could have underlying inflammatory bowel disease.  As such we will arrange colonoscopy for evaluation with Dr. Castillo.    Labs today with CBC, CMP, CRP, sed rate, and celiac panel.    If celiac panel found to be positive will arrange EGD in combination with colonoscopy however patient denies upper GI symptoms on visit today.    Trial of antispasmodic Bentyl in the interim.    Further recommendations and follow-up pending the aforementioned  work-up.

## 2020-12-03 LAB
ENDOMYSIUM IGA SER QL: NEGATIVE
GLIADIN PEPTIDE IGA SER-ACNC: 5 UNITS (ref 0–19)
GLIADIN PEPTIDE IGG SER-ACNC: 2 UNITS (ref 0–19)
IGA SERPL-MCNC: 196 MG/DL (ref 90–386)
TTG IGA SER-ACNC: <2 U/ML (ref 0–3)
TTG IGG SER-ACNC: 3 U/ML (ref 0–5)

## 2020-12-10 ENCOUNTER — TELEPHONE (OUTPATIENT)
Dept: GASTROENTEROLOGY | Facility: CLINIC | Age: 30
End: 2020-12-10

## 2020-12-10 NOTE — TELEPHONE ENCOUNTER
----- Message from JAVIER Crowe sent at 12/3/2020  3:00 PM EST -----  Regarding: FW:  Let the patient know his labs are normal.  ----- Message -----  From: Logan, Reflab Results In  Sent: 12/2/2020   8:08 PM EST  To: JAVIER Crowe

## 2022-08-09 ENCOUNTER — OFFICE VISIT (OUTPATIENT)
Dept: GASTROENTEROLOGY | Facility: CLINIC | Age: 32
End: 2022-08-09

## 2022-08-09 VITALS
TEMPERATURE: 96.2 F | BODY MASS INDEX: 25.46 KG/M2 | WEIGHT: 198.4 LBS | HEART RATE: 69 BPM | DIASTOLIC BLOOD PRESSURE: 78 MMHG | SYSTOLIC BLOOD PRESSURE: 119 MMHG | HEIGHT: 74 IN

## 2022-08-09 DIAGNOSIS — K62.5 RECTAL BLEEDING: ICD-10-CM

## 2022-08-09 DIAGNOSIS — R19.5 LOOSE STOOLS: ICD-10-CM

## 2022-08-09 DIAGNOSIS — R10.9 ABDOMINAL CRAMPS: Primary | ICD-10-CM

## 2022-08-09 PROCEDURE — 99214 OFFICE O/P EST MOD 30 MIN: CPT | Performed by: NURSE PRACTITIONER

## 2022-08-09 RX ORDER — DICYCLOMINE HYDROCHLORIDE 10 MG/1
10 CAPSULE ORAL
Qty: 120 CAPSULE | Refills: 3 | Status: SHIPPED | OUTPATIENT
Start: 2022-08-09

## 2022-08-09 NOTE — PROGRESS NOTES
Chief Complaint   Patient presents with   • Rectal Bleeding       HPI    Trav Steward III is a  32 y.o. male here for a follow up visit for abdominal cramps and rectal bleeding.    This patient follows with Dr. Castillo myself.    Seen in the office in December 2020 for complaints of abdominal cramps, rectal bleeding and diarrhea onset 10 years prior. Plans were to proceed with colonoscopy but this was canceled.    He returns today with the same symptoms he was seen for in the past.  Lower abdominal cramps, mixed consistency stool ranging from mushy to formed, and rectal bleeding intermittent in nature.  Symptoms come and go.  He can go several months and feel fine and then have return of the aforementioned symptoms.  Denies overt abdominal pain, rectal pain, or constipation.  Uses Bentyl as needed which she finds efficacious.  His weight has fluctuated over the last several years between 5- 10 pounds.    No upper GI complaints.    Routine lab work on prior office visit was negative for celiac disease.     He denies family history of colon cancer but does report a history of Crohn's disease in his maternal grandmother.    He still has his gallbladder.    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     Past Medical History:   Diagnosis Date   • Inguinal hernia    • Mitral valve prolapse    • PFO (patent foramen ovale)     FOLLOWED BY .. LAST ECHO 2019, EKG 2018   • Reduced libido    • Right shoulder pain    • Staph infection 2007    ON FACE    • Tonsil stone        Past Surgical History:   Procedure Laterality Date   • INGUINAL HERNIA REPAIR Right 10/23/2020    Procedure: INGUINAL HERNIA REPAIR LAPAROSCOPIC RIGHT;  Surgeon: Mynor Sears MD;  Location: [facility] OR [facility] [place];  Service: General;  Laterality: Right;   • NO PAST SURGERIES         Scheduled Meds:     Continuous Infusions: No current facility-administered medications for this visit.      PRN Meds:     Allergies    Allergen Reactions   • Penicillins Unknown - High Severity     As a child   • Amoxicillin Nausea Only       Social History     Socioeconomic History   • Marital status:    Tobacco Use   • Smoking status: Former Smoker     Quit date: 2013     Years since quittin.6   • Smokeless tobacco: Never Used   • Tobacco comment: caffeine use   Substance and Sexual Activity   • Alcohol use: Yes     Comment: Socially   • Drug use: Defer   • Sexual activity: Defer       Family History   Problem Relation Age of Onset   • Hypertension Mother    • Diabetes Maternal Grandfather    • Crohn's disease Paternal Grandmother    • Malig Hyperthermia Neg Hx        Review of Systems   Constitutional: Negative for activity change, appetite change, fatigue, fever and unexpected weight change.   HENT: Negative for trouble swallowing.    Respiratory: Negative for apnea, cough, choking, chest tightness, shortness of breath and wheezing.    Cardiovascular: Negative for chest pain, palpitations and leg swelling.   Gastrointestinal: Positive for anal bleeding. Negative for abdominal distention, abdominal pain, blood in stool, constipation, diarrhea, nausea, rectal pain and vomiting.        + Abdominal cramps with loose stool       Vitals:    22 0949   BP: 119/78   Pulse: 69   Temp: 96.2 °F (35.7 °C)       Physical Exam  Constitutional:       Appearance: He is well-developed.   Abdominal:      General: Bowel sounds are normal. There is no distension.      Palpations: Abdomen is soft. There is no mass.      Tenderness: There is no abdominal tenderness. There is no guarding.      Hernia: No hernia is present.   Skin:     General: Skin is warm and dry.      Capillary Refill: Capillary refill takes less than 2 seconds.   Neurological:      Mental Status: He is alert and oriented to person, place, and time.   Psychiatric:         Behavior: Behavior normal.     Assessment    Diagnoses and all orders for this visit:    1. Abdominal  cramps (Primary)  Overview:  Added automatically from request for surgery [id number]    Orders:  -     Case Request; Standing  -     Case Request    2. Loose stools  Overview:  Added automatically from request for surgery [id number]    Orders:  -     Case Request; Standing  -     Case Request    3. Rectal bleeding  Overview:  Added automatically from request for surgery [id number]    Orders:  -     Case Request; Standing  -     Case Request    Other orders  -     dicyclomine (Bentyl) 10 MG capsule; Take 1 capsule by mouth 4 (Four) Times a Day Before Meals & at Bedtime.  Dispense: 120 capsule; Refill: 3       Plan    Very pleasant 32-year-old male seen today for greater than 10-year history of lower abdominal cramps, loose stools, and rectal bleeding episodic in nature.  We discussed the benefits of moving forward with a colonoscopy as recommended on office visit 2 years ago to rule out inflammatory bowel disease versus irritable bowel syndrome with exacerbation of hemorrhoids and he is agreeable.  Risk/benefits of procedure reviewed with the patient all questions were answered.  Continue Bentyl antispasmodic in the interim.  Follow-up and further recommendations pending endoscopic findings.         JAVIER Crowe  [facility]  [address] [place] [address]  Office: [phone]

## 2022-09-19 ENCOUNTER — TELEPHONE (OUTPATIENT)
Dept: GASTROENTEROLOGY | Facility: CLINIC | Age: 32
End: 2022-09-19

## 2023-03-20 NOTE — PROGRESS NOTES
Subjective   Trav Steward III is a 28 y.o. male.   Patient with several issues needs follow-up on mitral valve prolapse due to get updated echocardiogram also has a lesion on the right foot which will need dermatology see this have recurrent problems with a stroke apparently has tonsillar stones were get some impaction family left tonsil he is currently picking things up occasionally cough out or spit out debris.  We will get ENT consult  History of Present Illness   As above mitral prolapse overdue for echocardiogram we'll get updated value.  He is having some palpitations for which we'll send in cardiology as well.  Response right foot this.resolving tried several creams of dermatologists look at this and he has one he uses so he will self schedule that.  Throat is predominantly debris collecting in tonsillar crypts or tonsillar stones we will have him see ENT for updated recommendation at that pain ongoing drainage from sinuses as well as this prior skin testing for allergies without too many allergens.  We will let patient try Singulair see if this helps with sinus drainage if not then ENT can address further.  Also's recent have some libido issues as well intermittent ED problems we'll get updated testosterone given fact he is got palpitations and mitral valve prolapse we will hold off any Viagra-type medication until after evaluation by cardiology.    Review of Systems   HENT: Positive for sore throat.    All other systems reviewed and are negative.      Objective   Vitals:    11/26/18 1100   BP: 110/70   Pulse: 56   Temp: 98.4 °F (36.9 °C)   SpO2: 98%   Weight: 92.6 kg (204 lb 3.2 oz)     Physical Exam   Constitutional: He appears well-nourished.   HENT:   Head: Normocephalic and atraumatic.   Throat without inflammation.  Both tonsils are 1-2+.  There is a slightly larger tonsillar drip visible on the left.  But no debris or stones collecting.   Eyes: Conjunctivae are normal. Pupils are equal, round,  and reactive to light.   Cardiovascular: Normal rate, regular rhythm and normal heart sounds.   Pulmonary/Chest: Effort normal and breath sounds normal.   Abdominal: Soft. Bowel sounds are normal.   Neurological: He is alert.   Unremarkable gait and station   Skin: Skin is warm and dry.   In web space between third and fourth toe.  Very minute.  Cannot tell this is a plantar wart versus other will have his dermatologist see him for this.   Psychiatric: He has a normal mood and affect.   Nursing note and vitals reviewed.      No results found for: INR    Procedures    Assessment/Plan   .  Mitral prolapse plan    1.  Mitral valve prolapse plan updated echocardiogram and cardiology consult Dr. Lopez's group also some palpitations with this with further evaluation for same    2.  Tonsillar stone recurrent plan refer to ENT    3.  Sinus drainage trial Singulair if this does not suffice ENT can further advise    4.  Reduced libido plan await pending TSH as well as testosterone.  Further recommendations to follow    5.   ED intermittent plan await all pending lab as well as cardiology evaluation ED prior to recommendations    Much of this encounter note is an electronic transcription/translation of spoken language to printed text.  The electronic translation of spoken language may permit erroneous, or at times, nonsensical words or phrases to be inadvertently transcribed.  Although I have reviewed the note for such errors, some may still exist. If there are questions or for further clarification, please contact me.      11

## 2024-04-16 ENCOUNTER — OFFICE (AMBULATORY)
Dept: URBAN - METROPOLITAN AREA CLINIC 76 | Facility: CLINIC | Age: 34
End: 2024-04-16

## 2024-04-16 VITALS
WEIGHT: 187 LBS | HEIGHT: 74 IN | OXYGEN SATURATION: 98 % | DIASTOLIC BLOOD PRESSURE: 69 MMHG | SYSTOLIC BLOOD PRESSURE: 129 MMHG | HEART RATE: 75 BPM

## 2024-04-16 DIAGNOSIS — R19.7 DIARRHEA, UNSPECIFIED: ICD-10-CM

## 2024-04-16 DIAGNOSIS — Z80.0 FAMILY HISTORY OF MALIGNANT NEOPLASM OF DIGESTIVE ORGANS: ICD-10-CM

## 2024-04-16 DIAGNOSIS — K62.5 HEMORRHAGE OF ANUS AND RECTUM: ICD-10-CM

## 2024-04-16 PROCEDURE — 99204 OFFICE O/P NEW MOD 45 MIN: CPT | Performed by: INTERNAL MEDICINE

## 2024-04-16 RX ORDER — CLINDAMYCIN HYDROCHLORIDE 300 MG/1
CAPSULE ORAL
Qty: 2 | Refills: 0 | Status: COMPLETED
Start: 2024-04-16 | End: 2024-08-13

## 2024-04-16 RX ORDER — DICYCLOMINE HYDROCHLORIDE 10 MG/1
CAPSULE ORAL
Qty: 45 | Refills: 3 | Status: ACTIVE
Start: 2024-04-16

## 2024-07-31 VITALS
TEMPERATURE: 98.1 F | HEART RATE: 64 BPM | DIASTOLIC BLOOD PRESSURE: 66 MMHG | HEART RATE: 67 BPM | DIASTOLIC BLOOD PRESSURE: 72 MMHG | RESPIRATION RATE: 13 BRPM | HEART RATE: 65 BPM | DIASTOLIC BLOOD PRESSURE: 74 MMHG | RESPIRATION RATE: 15 BRPM | HEART RATE: 68 BPM | SYSTOLIC BLOOD PRESSURE: 115 MMHG | OXYGEN SATURATION: 98 % | DIASTOLIC BLOOD PRESSURE: 73 MMHG | RESPIRATION RATE: 11 BRPM | RESPIRATION RATE: 18 BRPM | RESPIRATION RATE: 17 BRPM | DIASTOLIC BLOOD PRESSURE: 68 MMHG | SYSTOLIC BLOOD PRESSURE: 118 MMHG | DIASTOLIC BLOOD PRESSURE: 65 MMHG | RESPIRATION RATE: 14 BRPM | DIASTOLIC BLOOD PRESSURE: 75 MMHG | SYSTOLIC BLOOD PRESSURE: 117 MMHG | SYSTOLIC BLOOD PRESSURE: 116 MMHG | HEIGHT: 74 IN | HEART RATE: 62 BPM | TEMPERATURE: 97.1 F | SYSTOLIC BLOOD PRESSURE: 119 MMHG | SYSTOLIC BLOOD PRESSURE: 126 MMHG | HEART RATE: 69 BPM | SYSTOLIC BLOOD PRESSURE: 120 MMHG | OXYGEN SATURATION: 100 % | OXYGEN SATURATION: 99 % | SYSTOLIC BLOOD PRESSURE: 127 MMHG | WEIGHT: 187 LBS | DIASTOLIC BLOOD PRESSURE: 69 MMHG

## 2024-08-05 ENCOUNTER — OFFICE (AMBULATORY)
Dept: URBAN - METROPOLITAN AREA PATHOLOGY 4 | Facility: PATHOLOGY | Age: 34
End: 2024-08-05
Payer: COMMERCIAL

## 2024-08-05 ENCOUNTER — AMBULATORY SURGICAL CENTER (AMBULATORY)
Dept: URBAN - METROPOLITAN AREA SURGERY 17 | Facility: SURGERY | Age: 34
End: 2024-08-05
Payer: COMMERCIAL

## 2024-08-05 DIAGNOSIS — K63.89 OTHER SPECIFIED DISEASES OF INTESTINE: ICD-10-CM

## 2024-08-05 DIAGNOSIS — K92.1 MELENA: ICD-10-CM

## 2024-08-05 LAB
GI HISTOLOGY: A. RANDOM RIGHT COLON: (no result)
GI HISTOLOGY: B. RANDOM LEFT COLON: (no result)
GI HISTOLOGY: PDF REPORT: (no result)

## 2024-08-05 PROCEDURE — 88305 TISSUE EXAM BY PATHOLOGIST: CPT | Performed by: PATHOLOGY

## 2024-08-05 PROCEDURE — 45380 COLONOSCOPY AND BIOPSY: CPT | Performed by: INTERNAL MEDICINE

## 2024-08-13 ENCOUNTER — OFFICE (AMBULATORY)
Age: 34
End: 2024-08-13

## 2024-08-13 ENCOUNTER — OFFICE (AMBULATORY)
Dept: URBAN - METROPOLITAN AREA CLINIC 76 | Facility: CLINIC | Age: 34
End: 2024-08-13

## 2024-08-13 VITALS
HEIGHT: 74 IN | WEIGHT: 182 LBS | HEIGHT: 74 IN | WEIGHT: 182 LBS | WEIGHT: 182 LBS | WEIGHT: 182 LBS | WEIGHT: 182 LBS | HEIGHT: 74 IN | HEIGHT: 74 IN | HEIGHT: 74 IN | HEIGHT: 74 IN | HEIGHT: 74 IN | WEIGHT: 182 LBS | WEIGHT: 182 LBS

## 2024-08-13 DIAGNOSIS — Z80.0 FAMILY HISTORY OF MALIGNANT NEOPLASM OF DIGESTIVE ORGANS: ICD-10-CM

## 2024-08-13 DIAGNOSIS — R19.7 DIARRHEA, UNSPECIFIED: ICD-10-CM

## 2024-08-13 PROCEDURE — 99213 OFFICE O/P EST LOW 20 MIN: CPT | Performed by: INTERNAL MEDICINE

## (undated) DEVICE — ENDOPATH XCEL BLADELESS TROCARS WITH STABILITY SLEEVES: Brand: ENDOPATH XCEL

## (undated) DEVICE — ADHS SKIN DERMABOND TOP ADVANCED

## (undated) DEVICE — SKIN PREP TRAY W/CHG: Brand: MEDLINE INDUSTRIES, INC.

## (undated) DEVICE — ENDOPATH XCEL UNIVERSAL TROCAR STABLILITY SLEEVES: Brand: ENDOPATH XCEL

## (undated) DEVICE — SUT VIC 5/0 PS2 18IN J495H

## (undated) DEVICE — GLV SURG PREMIERPRO ORTHO LTX PF SZ7.5 BRN

## (undated) DEVICE — ENDOCUT SCISSOR TIP, DISPOSABLE: Brand: RENEW

## (undated) DEVICE — OSC GEN LAPAROSCOPY: Brand: MEDLINE INDUSTRIES, INC.

## (undated) DEVICE — SUT VIC 0 TN 27IN DYED JTN0G